# Patient Record
Sex: MALE | Race: BLACK OR AFRICAN AMERICAN | NOT HISPANIC OR LATINO | Employment: OTHER | URBAN - METROPOLITAN AREA
[De-identification: names, ages, dates, MRNs, and addresses within clinical notes are randomized per-mention and may not be internally consistent; named-entity substitution may affect disease eponyms.]

---

## 2023-12-27 ENCOUNTER — APPOINTMENT (OUTPATIENT)
Dept: UROLOGY | Facility: HOSPITAL | Age: 88
End: 2023-12-27

## 2023-12-28 ENCOUNTER — TELEMEDICINE (OUTPATIENT)
Dept: UROLOGY | Facility: CLINIC | Age: 88
End: 2023-12-28
Payer: MEDICARE

## 2023-12-28 DIAGNOSIS — N13.8 BPH WITH OBSTRUCTION/LOWER URINARY TRACT SYMPTOMS: Primary | ICD-10-CM

## 2023-12-28 DIAGNOSIS — N40.1 BPH WITH OBSTRUCTION/LOWER URINARY TRACT SYMPTOMS: Primary | ICD-10-CM

## 2023-12-28 PROBLEM — N42.9 PROSTATE DISORDER: Status: ACTIVE | Noted: 2023-12-28

## 2023-12-28 PROCEDURE — 99213 OFFICE O/P EST LOW 20 MIN: CPT | Performed by: NURSE PRACTITIONER

## 2023-12-28 RX ORDER — DOXAZOSIN 2 MG/1
2 TABLET ORAL DAILY
COMMUNITY
Start: 2015-01-19 | End: 2023-12-28 | Stop reason: SDUPTHER

## 2023-12-28 RX ORDER — FINASTERIDE 5 MG/1
5 TABLET, FILM COATED ORAL DAILY
Qty: 90 TABLET | Refills: 3 | Status: SHIPPED | OUTPATIENT
Start: 2023-12-28 | End: 2024-02-08 | Stop reason: SDUPTHER

## 2023-12-28 RX ORDER — FINASTERIDE 5 MG/1
1 TABLET, FILM COATED ORAL DAILY
COMMUNITY
Start: 2015-01-19 | End: 2023-12-28 | Stop reason: SDUPTHER

## 2023-12-28 RX ORDER — DOXAZOSIN 2 MG/1
2 TABLET ORAL DAILY
Qty: 90 TABLET | Refills: 3 | Status: SHIPPED | OUTPATIENT
Start: 2023-12-28

## 2023-12-28 NOTE — PROGRESS NOTES
Subjective   Patient ID: Chemo Maldonado is a 89 y.o. male presenting virtually for FUV     HPI  Last visit with Dr. Sullivan 2022. History of BPH with LUTS on finasteride 5 mg QD and doxazosin 2 mg every day  PSA screening - 2023 0.52, 22 0.5, 21 0.6, 10/28/20 0.43  Family history of prostate cancer - Brother, , with bone mets  Today's visit: 89 year old male presents via phone call for routine follow up. He has been doing well and continues with NTF x2 which is not bothersome. He denies any side effects on finasteride and doxazosin. NKDA. Denies gross hematuria, dysuria, nocturia, flank pain, and bothersome frequency or urgency.     Review of Systems  All other systems have been reviewed and are negative for complaint.    Objective   Physical Exam    Assessment/Plan   Diagnoses and all orders for this visit:  BPH with obstruction/lower urinary tract symptoms      PSA screening - 2023 0.52, 22 0.5, 21 0.6, 10/28/20 0.43  Family history of prostate cancer - Brother, , with bone mets  Today's visit: 89 year old male presents via phone call for routine follow up. He has been doing well and continues with NTF x2 which is not bothersome. He denies any side effects on finasteride and doxazosin. NKDA. Denies gross hematuria, dysuria, nocturia, flank pain, and bothersome frequency or urgency.     Continue Finasteride 5 mg daily and Doxazosin 2 mg daily. Refilled today     He is doing well. I suggested that he continue his current routine. I asked him to follow up with me in 1 year for re-evaluation or sooner as needed. All questions and concerns were addressed. Patient verbalizes understanding and has no other questions at this time.     Stacie Villagomez, CNP

## 2024-01-03 ENCOUNTER — APPOINTMENT (OUTPATIENT)
Dept: UROLOGY | Facility: HOSPITAL | Age: 89
End: 2024-01-03

## 2024-02-08 DIAGNOSIS — N13.8 BPH WITH OBSTRUCTION/LOWER URINARY TRACT SYMPTOMS: ICD-10-CM

## 2024-02-08 DIAGNOSIS — N40.1 BPH WITH OBSTRUCTION/LOWER URINARY TRACT SYMPTOMS: ICD-10-CM

## 2024-02-08 RX ORDER — FINASTERIDE 5 MG/1
5 TABLET, FILM COATED ORAL DAILY
Qty: 90 TABLET | Refills: 3 | Status: SHIPPED | OUTPATIENT
Start: 2024-02-08

## 2024-04-01 ENCOUNTER — APPOINTMENT (OUTPATIENT)
Dept: RADIOLOGY | Facility: HOSPITAL | Age: 89
DRG: 558 | End: 2024-04-01
Payer: MEDICARE

## 2024-04-01 ENCOUNTER — HOSPITAL ENCOUNTER (INPATIENT)
Facility: HOSPITAL | Age: 89
LOS: 5 days | Discharge: NURSING, REHAB OR LONG-TERM CARE | DRG: 558 | End: 2024-04-06
Attending: STUDENT IN AN ORGANIZED HEALTH CARE EDUCATION/TRAINING PROGRAM | Admitting: INTERNAL MEDICINE
Payer: MEDICARE

## 2024-04-01 ENCOUNTER — CLINICAL SUPPORT (OUTPATIENT)
Dept: EMERGENCY MEDICINE | Facility: HOSPITAL | Age: 89
DRG: 558 | End: 2024-04-01
Payer: MEDICARE

## 2024-04-01 DIAGNOSIS — F80.2: ICD-10-CM

## 2024-04-01 DIAGNOSIS — N40.1 BENIGN PROSTATIC HYPERPLASIA WITH LOWER URINARY TRACT SYMPTOMS, SYMPTOM DETAILS UNSPECIFIED: ICD-10-CM

## 2024-04-01 DIAGNOSIS — I48.91 ATRIAL FIBRILLATION, UNSPECIFIED TYPE (MULTI): Primary | ICD-10-CM

## 2024-04-01 DIAGNOSIS — N30.00 ACUTE CYSTITIS WITHOUT HEMATURIA: ICD-10-CM

## 2024-04-01 DIAGNOSIS — T79.6XXA TRAUMATIC RHABDOMYOLYSIS, INITIAL ENCOUNTER (CMS-HCC): ICD-10-CM

## 2024-04-01 DIAGNOSIS — R60.0 LOWER EXTREMITY EDEMA: ICD-10-CM

## 2024-04-01 DIAGNOSIS — E51.2 WERNICKE ENCEPHALOPATHY: ICD-10-CM

## 2024-04-01 LAB
ABO GROUP (TYPE) IN BLOOD: NORMAL
ALBUMIN SERPL BCP-MCNC: 3.5 G/DL (ref 3.4–5)
ALP SERPL-CCNC: 59 U/L (ref 33–136)
ALT SERPL W P-5'-P-CCNC: 45 U/L (ref 10–52)
ANION GAP BLDV CALCULATED.4IONS-SCNC: 13 MMOL/L (ref 10–25)
ANION GAP SERPL CALC-SCNC: 16 MMOL/L (ref 10–20)
ANTIBODY SCREEN: NORMAL
APPEARANCE UR: ABNORMAL
AST SERPL W P-5'-P-CCNC: 133 U/L (ref 9–39)
BASE EXCESS BLDV CALC-SCNC: 1.7 MMOL/L (ref -2–3)
BASOPHILS # BLD MANUAL: 0 X10*3/UL (ref 0–0.1)
BASOPHILS NFR BLD MANUAL: 0 %
BILIRUB SERPL-MCNC: 1 MG/DL (ref 0–1.2)
BILIRUB UR STRIP.AUTO-MCNC: NEGATIVE MG/DL
BODY TEMPERATURE: 37 DEGREES CELSIUS
BUN SERPL-MCNC: 28 MG/DL (ref 6–23)
CA-I BLDV-SCNC: 1.23 MMOL/L (ref 1.1–1.33)
CALCIUM SERPL-MCNC: 9.3 MG/DL (ref 8.6–10.6)
CHLORIDE BLDV-SCNC: 99 MMOL/L (ref 98–107)
CHLORIDE SERPL-SCNC: 103 MMOL/L (ref 98–107)
CK SERPL-CCNC: 8318 U/L (ref 0–325)
CO2 SERPL-SCNC: 22 MMOL/L (ref 21–32)
COLOR UR: ABNORMAL
CREAT SERPL-MCNC: 1.2 MG/DL (ref 0.5–1.3)
EGFRCR SERPLBLD CKD-EPI 2021: 58 ML/MIN/1.73M*2
EOSINOPHIL # BLD MANUAL: 0 X10*3/UL (ref 0–0.4)
EOSINOPHIL NFR BLD MANUAL: 0 %
ERYTHROCYTE [DISTWIDTH] IN BLOOD BY AUTOMATED COUNT: 13.2 % (ref 11.5–14.5)
ETHANOL SERPL-MCNC: <10 MG/DL
GLUCOSE BLDV-MCNC: 184 MG/DL (ref 74–99)
GLUCOSE SERPL-MCNC: 167 MG/DL (ref 74–99)
GLUCOSE UR STRIP.AUTO-MCNC: NORMAL MG/DL
HCO3 BLDV-SCNC: 26.6 MMOL/L (ref 22–26)
HCT VFR BLD AUTO: 40 % (ref 41–52)
HCT VFR BLD EST: 44 % (ref 41–52)
HGB BLD-MCNC: 14 G/DL (ref 13.5–17.5)
HGB BLDV-MCNC: 14.7 G/DL (ref 13.5–17.5)
IMM GRANULOCYTES # BLD AUTO: 0.12 X10*3/UL (ref 0–0.5)
IMM GRANULOCYTES NFR BLD AUTO: 1.1 % (ref 0–0.9)
INR PPP: 1.3 (ref 0.9–1.1)
KETONES UR STRIP.AUTO-MCNC: NEGATIVE MG/DL
LACTATE BLDV-SCNC: 3.3 MMOL/L (ref 0.4–2)
LACTATE SERPL-SCNC: 1.6 MMOL/L (ref 0.4–2)
LACTATE SERPL-SCNC: 3.3 MMOL/L (ref 0.4–2)
LACTATE SERPL-SCNC: 4.3 MMOL/L (ref 0.4–2)
LEUKOCYTE ESTERASE UR QL STRIP.AUTO: ABNORMAL
LYMPHOCYTES # BLD MANUAL: 0.48 X10*3/UL (ref 0.8–3)
LYMPHOCYTES NFR BLD MANUAL: 4.3 %
MCH RBC QN AUTO: 30 PG (ref 26–34)
MCHC RBC AUTO-ENTMCNC: 35 G/DL (ref 32–36)
MCV RBC AUTO: 86 FL (ref 80–100)
MONOCYTES # BLD MANUAL: 0.87 X10*3/UL (ref 0.05–0.8)
MONOCYTES NFR BLD MANUAL: 7.8 %
MUCOUS THREADS #/AREA URNS AUTO: ABNORMAL /LPF
NEUTROPHILS # BLD MANUAL: 9.76 X10*3/UL (ref 1.6–5.5)
NEUTS BAND # BLD MANUAL: 0.38 X10*3/UL (ref 0–0.5)
NEUTS BAND NFR BLD MANUAL: 3.4 %
NEUTS SEG # BLD MANUAL: 9.38 X10*3/UL (ref 1.6–5)
NEUTS SEG NFR BLD MANUAL: 84.5 %
NITRITE UR QL STRIP.AUTO: NEGATIVE
NRBC BLD-RTO: 0 /100 WBCS (ref 0–0)
OVALOCYTES BLD QL SMEAR: ABNORMAL
OXYHGB MFR BLDV: 54.6 % (ref 45–75)
PCO2 BLDV: 42 MM HG (ref 41–51)
PH BLDV: 7.41 PH (ref 7.33–7.43)
PH UR STRIP.AUTO: 6 [PH]
PLATELET # BLD AUTO: 85 X10*3/UL (ref 150–450)
PO2 BLDV: 33 MM HG (ref 35–45)
POTASSIUM BLDV-SCNC: 4.3 MMOL/L (ref 3.5–5.3)
POTASSIUM SERPL-SCNC: 4.3 MMOL/L (ref 3.5–5.3)
PROT SERPL-MCNC: 6.5 G/DL (ref 6.4–8.2)
PROT UR STRIP.AUTO-MCNC: ABNORMAL MG/DL
PROTHROMBIN TIME: 14.3 SECONDS (ref 9.8–12.8)
RBC # BLD AUTO: 4.67 X10*6/UL (ref 4.5–5.9)
RBC # UR STRIP.AUTO: ABNORMAL /UL
RBC #/AREA URNS AUTO: >20 /HPF
RBC MORPH BLD: ABNORMAL
RH FACTOR (ANTIGEN D): NORMAL
SAO2 % BLDV: 56 % (ref 45–75)
SODIUM BLDV-SCNC: 134 MMOL/L (ref 136–145)
SODIUM SERPL-SCNC: 137 MMOL/L (ref 136–145)
SP GR UR STRIP.AUTO: >1.05
TOTAL CELLS COUNTED BLD: 116
UROBILINOGEN UR STRIP.AUTO-MCNC: NORMAL MG/DL
WBC # BLD AUTO: 11.1 X10*3/UL (ref 4.4–11.3)
WBC #/AREA URNS AUTO: >50 /HPF
WBC CLUMPS #/AREA URNS AUTO: ABNORMAL /HPF

## 2024-04-01 PROCEDURE — 82077 ASSAY SPEC XCP UR&BREATH IA: CPT | Performed by: STUDENT IN AN ORGANIZED HEALTH CARE EDUCATION/TRAINING PROGRAM

## 2024-04-01 PROCEDURE — 93970 EXTREMITY STUDY: CPT

## 2024-04-01 PROCEDURE — 87086 URINE CULTURE/COLONY COUNT: CPT | Performed by: SURGERY

## 2024-04-01 PROCEDURE — 81001 URINALYSIS AUTO W/SCOPE: CPT | Performed by: SURGERY

## 2024-04-01 PROCEDURE — 2550000001 HC RX 255 CONTRASTS: Performed by: STUDENT IN AN ORGANIZED HEALTH CARE EDUCATION/TRAINING PROGRAM

## 2024-04-01 PROCEDURE — 71260 CT THORAX DX C+: CPT

## 2024-04-01 PROCEDURE — 99285 EMERGENCY DEPT VISIT HI MDM: CPT | Performed by: STUDENT IN AN ORGANIZED HEALTH CARE EDUCATION/TRAINING PROGRAM

## 2024-04-01 PROCEDURE — 93005 ELECTROCARDIOGRAM TRACING: CPT

## 2024-04-01 PROCEDURE — 85007 BL SMEAR W/DIFF WBC COUNT: CPT | Performed by: STUDENT IN AN ORGANIZED HEALTH CARE EDUCATION/TRAINING PROGRAM

## 2024-04-01 PROCEDURE — 72128 CT CHEST SPINE W/O DYE: CPT | Mod: RCN

## 2024-04-01 PROCEDURE — 99285 EMERGENCY DEPT VISIT HI MDM: CPT | Mod: 25

## 2024-04-01 PROCEDURE — 72131 CT LUMBAR SPINE W/O DYE: CPT | Mod: RCN

## 2024-04-01 PROCEDURE — 85610 PROTHROMBIN TIME: CPT | Performed by: STUDENT IN AN ORGANIZED HEALTH CARE EDUCATION/TRAINING PROGRAM

## 2024-04-01 PROCEDURE — 93971 EXTREMITY STUDY: CPT | Performed by: RADIOLOGY

## 2024-04-01 PROCEDURE — 2500000004 HC RX 250 GENERAL PHARMACY W/ HCPCS (ALT 636 FOR OP/ED): Performed by: STUDENT IN AN ORGANIZED HEALTH CARE EDUCATION/TRAINING PROGRAM

## 2024-04-01 PROCEDURE — 83605 ASSAY OF LACTIC ACID: CPT | Performed by: STUDENT IN AN ORGANIZED HEALTH CARE EDUCATION/TRAINING PROGRAM

## 2024-04-01 PROCEDURE — 76376 3D RENDER W/INTRP POSTPROCES: CPT

## 2024-04-01 PROCEDURE — 70486 CT MAXILLOFACIAL W/O DYE: CPT

## 2024-04-01 PROCEDURE — 93010 ELECTROCARDIOGRAM REPORT: CPT | Performed by: STUDENT IN AN ORGANIZED HEALTH CARE EDUCATION/TRAINING PROGRAM

## 2024-04-01 PROCEDURE — 72125 CT NECK SPINE W/O DYE: CPT

## 2024-04-01 PROCEDURE — 85027 COMPLETE CBC AUTOMATED: CPT | Performed by: STUDENT IN AN ORGANIZED HEALTH CARE EDUCATION/TRAINING PROGRAM

## 2024-04-01 PROCEDURE — 84132 ASSAY OF SERUM POTASSIUM: CPT | Performed by: STUDENT IN AN ORGANIZED HEALTH CARE EDUCATION/TRAINING PROGRAM

## 2024-04-01 PROCEDURE — 99221 1ST HOSP IP/OBS SF/LOW 40: CPT | Performed by: SURGERY

## 2024-04-01 PROCEDURE — 72170 X-RAY EXAM OF PELVIS: CPT

## 2024-04-01 PROCEDURE — 82550 ASSAY OF CK (CPK): CPT | Performed by: STUDENT IN AN ORGANIZED HEALTH CARE EDUCATION/TRAINING PROGRAM

## 2024-04-01 PROCEDURE — G0390 TRAUMA RESPONS W/HOSP CRITI: HCPCS

## 2024-04-01 PROCEDURE — 83605 ASSAY OF LACTIC ACID: CPT | Mod: MUE | Performed by: STUDENT IN AN ORGANIZED HEALTH CARE EDUCATION/TRAINING PROGRAM

## 2024-04-01 PROCEDURE — 36415 COLL VENOUS BLD VENIPUNCTURE: CPT | Performed by: STUDENT IN AN ORGANIZED HEALTH CARE EDUCATION/TRAINING PROGRAM

## 2024-04-01 PROCEDURE — 80053 COMPREHEN METABOLIC PANEL: CPT | Performed by: STUDENT IN AN ORGANIZED HEALTH CARE EDUCATION/TRAINING PROGRAM

## 2024-04-01 PROCEDURE — 86901 BLOOD TYPING SEROLOGIC RH(D): CPT | Performed by: STUDENT IN AN ORGANIZED HEALTH CARE EDUCATION/TRAINING PROGRAM

## 2024-04-01 PROCEDURE — 1210000001 HC SEMI-PRIVATE ROOM DAILY

## 2024-04-01 PROCEDURE — 99223 1ST HOSP IP/OBS HIGH 75: CPT

## 2024-04-01 PROCEDURE — 2500000002 HC RX 250 W HCPCS SELF ADMINISTERED DRUGS (ALT 637 FOR MEDICARE OP, ALT 636 FOR OP/ED): Performed by: STUDENT IN AN ORGANIZED HEALTH CARE EDUCATION/TRAINING PROGRAM

## 2024-04-01 PROCEDURE — 71045 X-RAY EXAM CHEST 1 VIEW: CPT

## 2024-04-01 PROCEDURE — 70450 CT HEAD/BRAIN W/O DYE: CPT

## 2024-04-01 RX ORDER — SODIUM CHLORIDE 9 MG/ML
200 INJECTION, SOLUTION INTRAVENOUS CONTINUOUS
Status: DISCONTINUED | OUTPATIENT
Start: 2024-04-01 | End: 2024-04-03

## 2024-04-01 RX ORDER — ACETAMINOPHEN 325 MG/1
975 TABLET ORAL EVERY 6 HOURS PRN
Status: DISCONTINUED | OUTPATIENT
Start: 2024-04-01 | End: 2024-04-06 | Stop reason: HOSPADM

## 2024-04-01 RX ORDER — FINASTERIDE 5 MG/1
5 TABLET, FILM COATED ORAL DAILY
Status: DISCONTINUED | OUTPATIENT
Start: 2024-04-01 | End: 2024-04-06 | Stop reason: HOSPADM

## 2024-04-01 RX ORDER — FLUTICASONE PROPIONATE 50 MCG
2 SPRAY, SUSPENSION (ML) NASAL DAILY
Status: DISCONTINUED | OUTPATIENT
Start: 2024-04-01 | End: 2024-04-06 | Stop reason: HOSPADM

## 2024-04-01 RX ORDER — DOXAZOSIN 2 MG/1
2 TABLET ORAL DAILY
Status: DISCONTINUED | OUTPATIENT
Start: 2024-04-01 | End: 2024-04-02

## 2024-04-01 RX ORDER — ENOXAPARIN SODIUM 100 MG/ML
40 INJECTION SUBCUTANEOUS EVERY 24 HOURS
Status: DISCONTINUED | OUTPATIENT
Start: 2024-04-01 | End: 2024-04-06 | Stop reason: HOSPADM

## 2024-04-01 RX ADMIN — IOHEXOL 100 ML: 350 INJECTION, SOLUTION INTRAVENOUS at 11:40

## 2024-04-01 RX ADMIN — ENOXAPARIN SODIUM 40 MG: 100 INJECTION SUBCUTANEOUS at 20:30

## 2024-04-01 RX ADMIN — FLUTICASONE PROPIONATE 2 SPRAY: 50 SPRAY, METERED NASAL at 19:38

## 2024-04-01 RX ADMIN — ACETAMINOPHEN 975 MG: 325 TABLET ORAL at 19:55

## 2024-04-01 RX ADMIN — SODIUM CHLORIDE 200 ML/HR: 9 INJECTION, SOLUTION INTRAVENOUS at 17:25

## 2024-04-01 RX ADMIN — SODIUM CHLORIDE, SODIUM LACTATE, POTASSIUM CHLORIDE, AND CALCIUM CHLORIDE 1000 ML: 600; 310; 30; 20 INJECTION, SOLUTION INTRAVENOUS at 11:22

## 2024-04-01 ASSESSMENT — ENCOUNTER SYMPTOMS
ENDOCRINE NEGATIVE: 1
BACK PAIN: 0
ACTIVITY CHANGE: 0
CONSTITUTIONAL NEGATIVE: 1
HEADACHES: 0
ABDOMINAL PAIN: 0
PALPITATIONS: 0
NEUROLOGICAL NEGATIVE: 1
SHORTNESS OF BREATH: 0
NUMBNESS: 0
WEAKNESS: 0
MYALGIAS: 0
SORE THROAT: 0
TROUBLE SWALLOWING: 0
VOMITING: 0
FACIAL SWELLING: 0
RECTAL PAIN: 0
COLOR CHANGE: 0
NAUSEA: 0
DIZZINESS: 0
LIGHT-HEADEDNESS: 0
HEMATURIA: 0
GASTROINTESTINAL NEGATIVE: 1
CONFUSION: 0
EYE PAIN: 0
WHEEZING: 0
CHEST TIGHTNESS: 0
NECK PAIN: 0
FATIGUE: 0
AGITATION: 0
COUGH: 0
FEVER: 0
MYALGIAS: 1

## 2024-04-01 ASSESSMENT — LIFESTYLE VARIABLES
TOTAL SCORE: 0
HAVE YOU EVER FELT YOU SHOULD CUT DOWN ON YOUR DRINKING: NO
HAVE PEOPLE ANNOYED YOU BY CRITICIZING YOUR DRINKING: NO
EVER FELT BAD OR GUILTY ABOUT YOUR DRINKING: NO
EVER HAD A DRINK FIRST THING IN THE MORNING TO STEADY YOUR NERVES TO GET RID OF A HANGOVER: NO

## 2024-04-01 ASSESSMENT — PAIN - FUNCTIONAL ASSESSMENT: PAIN_FUNCTIONAL_ASSESSMENT: 0-10

## 2024-04-01 NOTE — H&P
History Of Present Illness  Chemo Maldonado is a 89M with hx of HTN, HLD, and BPH presenting to [facility] by EMS following a fall going down his stairs. Patient initially came into the ED as a limited trauma because he was reported to have been on blood thinners, but patient denies ever taking blood thinners and there is no record of him being prescribed any. He recalls the incident and does not report any LOC, dizziness, or light headedness. Patient also denies any head trauma or striking his head. He reports he fell around 10pm last night and had been lying there since. He called his god son who came and found him at the bottom of the stairs on his left side. Patient reports recently got a new cane, which was also found at the bottom of the stairs. Denies any pain, weakness, or headache. Patient denies hematuria, chest pain, or shortness of breath. Patient does endorse feeling of fluttering in his chest that comes and goes at night time. He also denies any appetite changes leading up to the fall. Patient reports that he lives alone in a duplex, is able to carry out ADL's, he still drives and is independent.     ED Course: given 1L LR bolus  Labs:   RFP- Na 137, K 4.3, Cl 103, Bicarb 22, BUN 28, Cr 1.20 (baseline 1.30 in )   CBC- WBC 11.1, Hgb 14.0, Hct 40.0, Plt 85 (baseline )   Creatine kinase 8,318  Lactate 3.3 -> 4.3   VBG: pH 7.41, pCO2 42, pO2 33   Alk phos 59, , ALT 45, total bilirubin 1.0    Imaging:   CT head, CT cervical spine: no acute process   CT A/P, thoracic and lumbar spine: 2.9 cm enhancing lesion noted along the posterior wall the bladder   CXR: no acute process    Past Medical History  BPH  HTN  Lymphedema     Surgical History  History reviewed. No pertinent surgical history.     Social History  Patient quit smoking in   Reports drinking 1 bottle liquor every year  Denies any other drug use   Other pertinent social history in HPI.    Family History  Brother  of prostate cancer with  bone mets.    Home meds:   Finasteride 5mg   Doxazosin 2mg   Furosemide 40mg BID  Lisinopril 10mg  Simvastatin 20mg   Flonase 50mcg     Allergies  Patient has no allergy information on record.    Review of Systems   Constitutional: Negative.    HENT: Negative.     Respiratory:  Negative for chest tightness, shortness of breath and wheezing.    Cardiovascular:  Positive for leg swelling. Negative for chest pain and palpitations.   Gastrointestinal: Negative.    Endocrine: Negative.    Genitourinary: Negative.    Musculoskeletal:  Positive for myalgias.   Skin: Negative.    Neurological: Negative.         Physical Exam  Constitutional:       Appearance: Normal appearance.   HENT:      Head: Normocephalic and atraumatic.   Cardiovascular:      Rate and Rhythm: Normal rate. Rhythm irregular.      Heart sounds: Normal heart sounds.   Pulmonary:      Effort: Pulmonary effort is normal.      Breath sounds: Normal breath sounds.   Abdominal:      General: Abdomen is flat. Bowel sounds are normal.      Palpations: Abdomen is soft.   Musculoskeletal:         General: Swelling present.      Cervical back: Normal range of motion. No tenderness.      Comments: 2+ BLE that patient reports has been present for 20+ years  Bruising on left shoulder    Skin:     General: Skin is warm and dry.   Neurological:      General: No focal deficit present.      Mental Status: He is alert and oriented to person, place, and time. Mental status is at baseline.   Psychiatric:         Mood and Affect: Mood normal.         Behavior: Behavior normal.         Thought Content: Thought content normal.          Last Recorded Vitals  Blood pressure 136/87, pulse (!) 101, temperature 36.2 °C (97.2 °F), resp. rate (!) 22, SpO2 98 %.    Relevant Results        Results for orders placed or performed during the hospital encounter of 04/01/24 (from the past 24 hour(s))   CBC and Auto Differential   Result Value Ref Range    WBC 11.1 4.4 - 11.3 x10*3/uL    nRBC  0.0 0.0 - 0.0 /100 WBCs    RBC 4.67 4.50 - 5.90 x10*6/uL    Hemoglobin 14.0 13.5 - 17.5 g/dL    Hematocrit 40.0 (L) 41.0 - 52.0 %    MCV 86 80 - 100 fL    MCH 30.0 26.0 - 34.0 pg    MCHC 35.0 32.0 - 36.0 g/dL    RDW 13.2 11.5 - 14.5 %    Platelets 85 (L) 150 - 450 x10*3/uL    Immature Granulocytes %, Automated 1.1 (H) 0.0 - 0.9 %    Immature Granulocytes Absolute, Automated 0.12 0.00 - 0.50 x10*3/uL   Comprehensive Metabolic Panel   Result Value Ref Range    Glucose 167 (H) 74 - 99 mg/dL    Sodium 137 136 - 145 mmol/L    Potassium 4.3 3.5 - 5.3 mmol/L    Chloride 103 98 - 107 mmol/L    Bicarbonate 22 21 - 32 mmol/L    Anion Gap 16 10 - 20 mmol/L    Urea Nitrogen 28 (H) 6 - 23 mg/dL    Creatinine 1.20 0.50 - 1.30 mg/dL    eGFR 58 (L) >60 mL/min/1.73m*2    Calcium 9.3 8.6 - 10.6 mg/dL    Albumin 3.5 3.4 - 5.0 g/dL    Alkaline Phosphatase 59 33 - 136 U/L    Total Protein 6.5 6.4 - 8.2 g/dL     (H) 9 - 39 U/L    Bilirubin, Total 1.0 0.0 - 1.2 mg/dL    ALT 45 10 - 52 U/L   Alcohol   Result Value Ref Range    Alcohol <10 <=10 mg/dL   Lactate   Result Value Ref Range    Lactate 3.3 (H) 0.4 - 2.0 mmol/L   Blood Gas, Venous Full Panel   Result Value Ref Range    POCT pH, Venous 7.41 7.33 - 7.43 pH    POCT pCO2, Venous 42 41 - 51 mm Hg    POCT pO2, Venous 33 (L) 35 - 45 mm Hg    POCT SO2, Venous 56 45 - 75 %    POCT Oxy Hemoglobin, Venous 54.6 45.0 - 75.0 %    POCT Hematocrit Calculated, Venous 44.0 41.0 - 52.0 %    POCT Sodium, Venous 134 (L) 136 - 145 mmol/L    POCT Potassium, Venous 4.3 3.5 - 5.3 mmol/L    POCT Chloride, Venous 99 98 - 107 mmol/L    POCT Ionized Calicum, Venous 1.23 1.10 - 1.33 mmol/L    POCT Glucose, Venous 184 (H) 74 - 99 mg/dL    POCT Lactate, Venous 3.3 (H) 0.4 - 2.0 mmol/L    POCT Base Excess, Venous 1.7 -2.0 - 3.0 mmol/L    POCT HCO3 Calculated, Venous 26.6 (H) 22.0 - 26.0 mmol/L    POCT Hemoglobin, Venous 14.7 13.5 - 17.5 g/dL    POCT Anion Gap, Venous 13.0 10.0 - 25.0 mmol/L    Patient  Temperature 37.0 degrees Celsius   Creatine Kinase   Result Value Ref Range    Creatine Kinase 8,318 (H) 0 - 325 U/L   Manual Differential   Result Value Ref Range    Neutrophils %, Manual 84.5 40.0 - 80.0 %    Bands %, Manual 3.4 0.0 - 5.0 %    Lymphocytes %, Manual 4.3 13.0 - 44.0 %    Monocytes %, Manual 7.8 2.0 - 10.0 %    Eosinophils %, Manual 0.0 0.0 - 6.0 %    Basophils %, Manual 0.0 0.0 - 2.0 %    Seg Neutrophils Absolute, Manual 9.38 (H) 1.60 - 5.00 x10*3/uL    Bands Absolute, Manual 0.38 0.00 - 0.50 x10*3/uL    Lymphocytes Absolute, Manual 0.48 (L) 0.80 - 3.00 x10*3/uL    Monocytes Absolute, Manual 0.87 (H) 0.05 - 0.80 x10*3/uL    Eosinophils Absolute, Manual 0.00 0.00 - 0.40 x10*3/uL    Basophils Absolute, Manual 0.00 0.00 - 0.10 x10*3/uL    Total Cells Counted 116     Neutrophils Absolute, Manual 9.76 (H) 1.60 - 5.50 x10*3/uL    RBC Morphology See Below     Ovalocytes Few    Protime-INR   Result Value Ref Range    Protime 14.3 (H) 9.8 - 12.8 seconds    INR 1.3 (H) 0.9 - 1.1   Type And Screen   Result Value Ref Range    ABO TYPE O     Rh TYPE POS     ANTIBODY SCREEN NEG    Lactate   Result Value Ref Range    Lactate 4.3 (HH) 0.4 - 2.0 mmol/L     CT thoracic spine wo IV contrast    Result Date: 4/1/2024  STUDY: CT Chest, Abdomen, and Pelvis with IV Contrast, CT Thoracic Spine and Lumbar Spine without IV Contrast; 04/01/2024, 11:46 AM. INDICATION: Trauma, unspecified. COMPARISON: Not available. ACCESSION NUMBER(S): [id number], [id number], [id number] ORDERING CLINICIAN: ARGENIS VU TECHNIQUE: CT of the chest, abdomen, and pelvis was performed.  Contiguous axial images were obtained at 3 mm slice thickness through the chest, abdomen, and pelvis.  Coronal and sagittal reconstructions at 3 mm slice thickness were performed.  Omnipaque 350 100 mL was administered intravenously. Please note that spinal images were generated from the original CT abdomen and pelvis imaging. FINDINGS: CHEST: MEDIASTINUM:  The heart is normal in size without pericardial effusion.  Central vascular structures opacify normally.  LUNGS/PLEURA: There is no pleural effusion, pleural thickening, or pneumothorax. The airways are patent. Lungs demonstrate mild left basal atelectasis.  There are cystic changes noted within the right lung apex which could represent some element of possible fibrosis.. LYMPH NODES: Thoracic lymph nodes are not enlarged. ABDOMEN:  LIVER: No hepatomegaly.  Smooth surface contour.  Normal attenuation.  BILE DUCTS: No intrahepatic or extrahepatic biliary ductal dilatation.  GALLBLADDER: The gallbladder is visualized.  There is cholelithiasis. STOMACH: No abnormalities identified.  PANCREAS: No masses or ductal dilatation.  SPLEEN: No splenomegaly or focal splenic lesion.  ADRENAL GLANDS: No thickening or nodules.  KIDNEYS AND URETERS: Kidneys are normal in size and location.  No renal or ureteral calculi.  There is a 3.1 cm hypoattenuating lesion within the right kidney most consistent with a simple cyst.  PELVIS:  BLADDER: There is a 1.9 x 2.9 cm enhancing lesion along the posterior wall of the bladder (301-152, 303-80).  A right posterior lateral bladder diverticulum is visualized.  REPRODUCTIVE ORGANS: There are calcific changes of the prostate gland noted.  BOWEL: There is no evidence of bowel thickening or dilatation to suggest mechanical obstruction.  VESSELS: No abnormalities identified.  Abdominal aorta is normal in caliber. There are calcific atherosclerotic changes of the abdominal aorta noted.  No abdominal aortic aneurysm.  PERITONEUM/RETROPERITONEUM/LYMPH NODES: No free fluid.  No pneumoperitoneum. No lymphadenopathy.  ABDOMINAL WALL: No abnormalities identified. SOFT TISSUES: There are probable bilateral hydroceles noted.  BONES: No acute fracture or aggressive osseous lesion. THORACIC SPINE: The alignment is anatomic.  There is no fracture or traumatic subluxation. The vertebral body heights are well  maintained.  Degenerative disc disease of the mid to lower thoracic spine with anterior marginal spurring noted.  nNo significant central canal stenosis is demonstrated.  The neural foramina are patent throughout.  The paravertebral soft tissues are within normal limits. LUMBAR SPINE: The alignment is anatomic.  There is no fracture or traumatic subluxation. The vertebral body heights are well maintained.  There is multilevel degenerative disc disease of the lumbar spine most prominently noted L4-L5..  No significant central canal stenosis is demonstrated.  The neural foramina are patent throughout.  The paravertebral soft tissues are within normal limits.    1.  No definite CT evidence of acute thoracic vascular injury. 2.  No definite CT evidence of acute abdominal or pelvic visceral/vascular injury. 3.  No pulmonary consolidation, pleural effusions or pneumothorax. 4.  No abdominal/pelvic fluid collections or pneumoperitoneum. 5.  There is a 2.9 cm enhancing lesion noted along the posterior wall the bladder which could be neoplastic in etiology.  I recommend further urologic evaluation. 6.  Probable bilateral hydroceles. 7.  No definite acute thoracic or lumbar vertebral body compression/fracture.  There is some multilevel degenerative disc disease of the thoracic and lumbar spine.. Signed by Ayan De La O MD    CT chest abdomen pelvis w IV contrast    Result Date: 4/1/2024  STUDY: CT Chest, Abdomen, and Pelvis with IV Contrast, CT Thoracic Spine and Lumbar Spine without IV Contrast; 04/01/2024, 11:46 AM. INDICATION: Trauma, unspecified. COMPARISON: Not available. ACCESSION NUMBER(S): [id number], [id number], [id number] ORDERING CLINICIAN: ARGENIS VU TECHNIQUE: CT of the chest, abdomen, and pelvis was performed.  Contiguous axial images were obtained at 3 mm slice thickness through the chest, abdomen, and pelvis.  Coronal and sagittal reconstructions at 3 mm slice thickness were performed.  Omnipaque  350 100 mL was administered intravenously. Please note that spinal images were generated from the original CT abdomen and pelvis imaging. FINDINGS: CHEST: MEDIASTINUM: The heart is normal in size without pericardial effusion.  Central vascular structures opacify normally.  LUNGS/PLEURA: There is no pleural effusion, pleural thickening, or pneumothorax. The airways are patent. Lungs demonstrate mild left basal atelectasis.  There are cystic changes noted within the right lung apex which could represent some element of possible fibrosis.. LYMPH NODES: Thoracic lymph nodes are not enlarged. ABDOMEN:  LIVER: No hepatomegaly.  Smooth surface contour.  Normal attenuation.  BILE DUCTS: No intrahepatic or extrahepatic biliary ductal dilatation.  GALLBLADDER: The gallbladder is visualized.  There is cholelithiasis. STOMACH: No abnormalities identified.  PANCREAS: No masses or ductal dilatation.  SPLEEN: No splenomegaly or focal splenic lesion.  ADRENAL GLANDS: No thickening or nodules.  KIDNEYS AND URETERS: Kidneys are normal in size and location.  No renal or ureteral calculi.  There is a 3.1 cm hypoattenuating lesion within the right kidney most consistent with a simple cyst.  PELVIS:  BLADDER: There is a 1.9 x 2.9 cm enhancing lesion along the posterior wall of the bladder (301-152, 303-80).  A right posterior lateral bladder diverticulum is visualized.  REPRODUCTIVE ORGANS: There are calcific changes of the prostate gland noted.  BOWEL: There is no evidence of bowel thickening or dilatation to suggest mechanical obstruction.  VESSELS: No abnormalities identified.  Abdominal aorta is normal in caliber. There are calcific atherosclerotic changes of the abdominal aorta noted.  No abdominal aortic aneurysm.  PERITONEUM/RETROPERITONEUM/LYMPH NODES: No free fluid.  No pneumoperitoneum. No lymphadenopathy.  ABDOMINAL WALL: No abnormalities identified. SOFT TISSUES: There are probable bilateral hydroceles noted.  BONES: No acute  fracture or aggressive osseous lesion. THORACIC SPINE: The alignment is anatomic.  There is no fracture or traumatic subluxation. The vertebral body heights are well maintained.  Degenerative disc disease of the mid to lower thoracic spine with anterior marginal spurring noted.  nNo significant central canal stenosis is demonstrated.  The neural foramina are patent throughout.  The paravertebral soft tissues are within normal limits. LUMBAR SPINE: The alignment is anatomic.  There is no fracture or traumatic subluxation. The vertebral body heights are well maintained.  There is multilevel degenerative disc disease of the lumbar spine most prominently noted L4-L5..  No significant central canal stenosis is demonstrated.  The neural foramina are patent throughout.  The paravertebral soft tissues are within normal limits.    1.  No definite CT evidence of acute thoracic vascular injury. 2.  No definite CT evidence of acute abdominal or pelvic visceral/vascular injury. 3.  No pulmonary consolidation, pleural effusions or pneumothorax. 4.  No abdominal/pelvic fluid collections or pneumoperitoneum. 5.  There is a 2.9 cm enhancing lesion noted along the posterior wall the bladder which could be neoplastic in etiology.  I recommend further urologic evaluation. 6.  Probable bilateral hydroceles. 7.  No definite acute thoracic or lumbar vertebral body compression/fracture.  There is some multilevel degenerative disc disease of the thoracic and lumbar spine.. Signed by Ayan De La O MD    CT lumbar spine wo IV contrast    Result Date: 4/1/2024  STUDY: CT Chest, Abdomen, and Pelvis with IV Contrast, CT Thoracic Spine and Lumbar Spine without IV Contrast; 04/01/2024, 11:46 AM. INDICATION: Trauma, unspecified. COMPARISON: Not available. ACCESSION NUMBER(S): [id number], [id number], [id number] ORDERING CLINICIAN: ARGENIS VU TECHNIQUE: CT of the chest, abdomen, and pelvis was performed.  Contiguous axial images were  obtained at 3 mm slice thickness through the chest, abdomen, and pelvis.  Coronal and sagittal reconstructions at 3 mm slice thickness were performed.  Omnipaque 350 100 mL was administered intravenously. Please note that spinal images were generated from the original CT abdomen and pelvis imaging. FINDINGS: CHEST: MEDIASTINUM: The heart is normal in size without pericardial effusion.  Central vascular structures opacify normally.  LUNGS/PLEURA: There is no pleural effusion, pleural thickening, or pneumothorax. The airways are patent. Lungs demonstrate mild left basal atelectasis.  There are cystic changes noted within the right lung apex which could represent some element of possible fibrosis.. LYMPH NODES: Thoracic lymph nodes are not enlarged. ABDOMEN:  LIVER: No hepatomegaly.  Smooth surface contour.  Normal attenuation.  BILE DUCTS: No intrahepatic or extrahepatic biliary ductal dilatation.  GALLBLADDER: The gallbladder is visualized.  There is cholelithiasis. STOMACH: No abnormalities identified.  PANCREAS: No masses or ductal dilatation.  SPLEEN: No splenomegaly or focal splenic lesion.  ADRENAL GLANDS: No thickening or nodules.  KIDNEYS AND URETERS: Kidneys are normal in size and location.  No renal or ureteral calculi.  There is a 3.1 cm hypoattenuating lesion within the right kidney most consistent with a simple cyst.  PELVIS:  BLADDER: There is a 1.9 x 2.9 cm enhancing lesion along the posterior wall of the bladder (301-152, 303-80).  A right posterior lateral bladder diverticulum is visualized.  REPRODUCTIVE ORGANS: There are calcific changes of the prostate gland noted.  BOWEL: There is no evidence of bowel thickening or dilatation to suggest mechanical obstruction.  VESSELS: No abnormalities identified.  Abdominal aorta is normal in caliber. There are calcific atherosclerotic changes of the abdominal aorta noted.  No abdominal aortic aneurysm.  PERITONEUM/RETROPERITONEUM/LYMPH NODES: No free fluid.  No  pneumoperitoneum. No lymphadenopathy.  ABDOMINAL WALL: No abnormalities identified. SOFT TISSUES: There are probable bilateral hydroceles noted.  BONES: No acute fracture or aggressive osseous lesion. THORACIC SPINE: The alignment is anatomic.  There is no fracture or traumatic subluxation. The vertebral body heights are well maintained.  Degenerative disc disease of the mid to lower thoracic spine with anterior marginal spurring noted.  nNo significant central canal stenosis is demonstrated.  The neural foramina are patent throughout.  The paravertebral soft tissues are within normal limits. LUMBAR SPINE: The alignment is anatomic.  There is no fracture or traumatic subluxation. The vertebral body heights are well maintained.  There is multilevel degenerative disc disease of the lumbar spine most prominently noted L4-L5..  No significant central canal stenosis is demonstrated.  The neural foramina are patent throughout.  The paravertebral soft tissues are within normal limits.    1.  No definite CT evidence of acute thoracic vascular injury. 2.  No definite CT evidence of acute abdominal or pelvic visceral/vascular injury. 3.  No pulmonary consolidation, pleural effusions or pneumothorax. 4.  No abdominal/pelvic fluid collections or pneumoperitoneum. 5.  There is a 2.9 cm enhancing lesion noted along the posterior wall the bladder which could be neoplastic in etiology.  I recommend further urologic evaluation. 6.  Probable bilateral hydroceles. 7.  No definite acute thoracic or lumbar vertebral body compression/fracture.  There is some multilevel degenerative disc disease of the thoracic and lumbar spine.. Signed by Ayan De La O MD    CT head W O contrast trauma protocol    Result Date: 4/1/2024  Interpreted By:  Td Guillaume and Sullivan Shannon STUDY: CT HEAD W/O CONTRAST TRAUMA PROTOCOL; CT FACIAL BONES WO IV CONTRAST; CT CERVICAL SPINE WO IV CONTRAST;  4/1/2024 11:41 am   INDICATION: Signs/Symptoms:trauma.    Chemo Maldonado MRN [id number]  1934   COMPARISON: Correlation with CT temporal bone 2012.   ACCESSION NUMBER(S): [id number]; [id number]; [id number]   ORDERING CLINICIAN: ARGENIS VU   TECHNIQUE: Noncontrast axial CT scan of head was performed. Angled reformats in brain and bone windows were generated. The images were reviewed in bone, brain, blood and soft tissue windows.   Thin cut axial CT images through the facial bones were obtained and reconstructed in the coronal and sagittal plane. 3D reconstructions were performed on an independent workstation and provided for review.   Axial CT images of the cervical spine are obtained. Axial, coronal and sagittal reconstructions are provided for review.   FINDINGS:   Noncontrast CT head:   No acute infarct or intracranial hemorrhage. No intraventricular hemorrhage. Patchy and confluent subcortical and periventricular white matter hypodensities are noted, which are nonspecific but likely reflect sequelae of chronic small ischemic changes. No mass effect or midline shift. No extra-axial fluid collection. The ventricles, sulci and basal cisterns are diffusely prominent, likely reflecting age-related parenchymal volume loss..   No displaced calvarial fracture.     CT facial bones:   Orbits: The bony orbits are intact. Bilateral lens replacements are noted. The orbital contents are otherwise unremarkable.   Facial Bones: There is no displaced facial bone fracture.   Mandible/Temporomandibular Joints: The patient is completely edentulous. Visualized portions of mandible and bilateral temporomandibular joints are intact.   Paranasal Sinuses/Mastoids: Minimal scattered mucosal thickening in the paranasal sinuses. The bilateral mastoid air cells are clear.   Soft tissues: Unremarkable.     CT cervical spine:   There is transitional vertebra in the cervicothoracic junction with truncated bilateral C7 cervical ribs. For the purposes of this examination, the  transitional vertebra will be referred to as C7 with the assumption of 7 cervical vertebrae.   Alignment: The atlantooccipital and atlantoaxial intervals are preserved. The cervical spinal columns are preserved, including the spinolaminar and posterior spinous lines, without evidence of spondylolisthesis.   Vertebrae/Intervertebral Discs: The vertebral bodies and intervertebral discs demonstrate expected height. Mild multilevel degenerative changes of the cervical spine are noted, including osteophytosis and posterior disc osteophyte complexes.   No fracture of the cervical spine.   Sesamoid ossicles are noted in the nuchal ligament overlying C3-C4. The prevertebral and posterior paraspinous soft tissues are otherwise unremarkable.           1. No acute intracranial abnormality. No calvarial fracture. 2. No acute facial bone fracture. 3. No acute fracture subluxation of the cervical spine detected     I personally reviewed the images/study and I agree with the findings as stated by Radiology resident Dr. Garcia.   MACRO: None   Signed by: Td Guillaume 2024 12:41 PM Dictation workstation:   [id number]    CT cervical spine wo IV contrast    Result Date: 2024  Interpreted By:  Td Guillaume and Sullivan Shannon STUDY: CT HEAD W/O CONTRAST TRAUMA PROTOCOL; CT FACIAL BONES WO IV CONTRAST; CT CERVICAL SPINE WO IV CONTRAST;  2024 11:41 am   INDICATION: Signs/Symptoms:trauma.   Chemo Maldonado MRN [id number]  1934   COMPARISON: Correlation with CT temporal bone 2012.   ACCESSION NUMBER(S): [id number]; [id number]; [id number]   ORDERING CLINICIAN: ARGENIS VU   TECHNIQUE: Noncontrast axial CT scan of head was performed. Angled reformats in brain and bone windows were generated. The images were reviewed in bone, brain, blood and soft tissue windows.   Thin cut axial CT images through the facial bones were obtained and reconstructed in the coronal and sagittal plane. 3D reconstructions were  performed on an independent workstation and provided for review.   Axial CT images of the cervical spine are obtained. Axial, coronal and sagittal reconstructions are provided for review.   FINDINGS:   Noncontrast CT head:   No acute infarct or intracranial hemorrhage. No intraventricular hemorrhage. Patchy and confluent subcortical and periventricular white matter hypodensities are noted, which are nonspecific but likely reflect sequelae of chronic small ischemic changes. No mass effect or midline shift. No extra-axial fluid collection. The ventricles, sulci and basal cisterns are diffusely prominent, likely reflecting age-related parenchymal volume loss..   No displaced calvarial fracture.     CT facial bones:   Orbits: The bony orbits are intact. Bilateral lens replacements are noted. The orbital contents are otherwise unremarkable.   Facial Bones: There is no displaced facial bone fracture.   Mandible/Temporomandibular Joints: The patient is completely edentulous. Visualized portions of mandible and bilateral temporomandibular joints are intact.   Paranasal Sinuses/Mastoids: Minimal scattered mucosal thickening in the paranasal sinuses. The bilateral mastoid air cells are clear.   Soft tissues: Unremarkable.     CT cervical spine:   There is transitional vertebra in the cervicothoracic junction with truncated bilateral C7 cervical ribs. For the purposes of this examination, the transitional vertebra will be referred to as C7 with the assumption of 7 cervical vertebrae.   Alignment: The atlantooccipital and atlantoaxial intervals are preserved. The cervical spinal columns are preserved, including the spinolaminar and posterior spinous lines, without evidence of spondylolisthesis.   Vertebrae/Intervertebral Discs: The vertebral bodies and intervertebral discs demonstrate expected height. Mild multilevel degenerative changes of the cervical spine are noted, including osteophytosis and posterior disc osteophyte  complexes.   No fracture of the cervical spine.   Sesamoid ossicles are noted in the nuchal ligament overlying C3-C4. The prevertebral and posterior paraspinous soft tissues are otherwise unremarkable.           1. No acute intracranial abnormality. No calvarial fracture. 2. No acute facial bone fracture. 3. No acute fracture subluxation of the cervical spine detected     I personally reviewed the images/study and I agree with the findings as stated by Radiology resident Dr. Garcia.   MACRO: None   Signed by: Td Guillaume 2024 12:41 PM Dictation workstation:   [id number]    CT maxillofacial bones wo IV contrast    Result Date: 2024  Interpreted By:  Td Guillaume and Sullivan Shannon STUDY: CT HEAD W/O CONTRAST TRAUMA PROTOCOL; CT FACIAL BONES WO IV CONTRAST; CT CERVICAL SPINE WO IV CONTRAST;  2024 11:41 am   INDICATION: Signs/Symptoms:trauma.   Chemo Maldondao MRN [id number]  1934   COMPARISON: Correlation with CT temporal bone 2012.   ACCESSION NUMBER(S): [id number]; [id number]; [id number]   ORDERING CLINICIAN: ARGENIS VU   TECHNIQUE: Noncontrast axial CT scan of head was performed. Angled reformats in brain and bone windows were generated. The images were reviewed in bone, brain, blood and soft tissue windows.   Thin cut axial CT images through the facial bones were obtained and reconstructed in the coronal and sagittal plane. 3D reconstructions were performed on an independent workstation and provided for review.   Axial CT images of the cervical spine are obtained. Axial, coronal and sagittal reconstructions are provided for review.   FINDINGS:   Noncontrast CT head:   No acute infarct or intracranial hemorrhage. No intraventricular hemorrhage. Patchy and confluent subcortical and periventricular white matter hypodensities are noted, which are nonspecific but likely reflect sequelae of chronic small ischemic changes. No mass effect or midline shift. No extra-axial fluid  collection. The ventricles, sulci and basal cisterns are diffusely prominent, likely reflecting age-related parenchymal volume loss..   No displaced calvarial fracture.     CT facial bones:   Orbits: The bony orbits are intact. Bilateral lens replacements are noted. The orbital contents are otherwise unremarkable.   Facial Bones: There is no displaced facial bone fracture.   Mandible/Temporomandibular Joints: The patient is completely edentulous. Visualized portions of mandible and bilateral temporomandibular joints are intact.   Paranasal Sinuses/Mastoids: Minimal scattered mucosal thickening in the paranasal sinuses. The bilateral mastoid air cells are clear.   Soft tissues: Unremarkable.     CT cervical spine:   There is transitional vertebra in the cervicothoracic junction with truncated bilateral C7 cervical ribs. For the purposes of this examination, the transitional vertebra will be referred to as C7 with the assumption of 7 cervical vertebrae.   Alignment: The atlantooccipital and atlantoaxial intervals are preserved. The cervical spinal columns are preserved, including the spinolaminar and posterior spinous lines, without evidence of spondylolisthesis.   Vertebrae/Intervertebral Discs: The vertebral bodies and intervertebral discs demonstrate expected height. Mild multilevel degenerative changes of the cervical spine are noted, including osteophytosis and posterior disc osteophyte complexes.   No fracture of the cervical spine.   Sesamoid ossicles are noted in the nuchal ligament overlying C3-C4. The prevertebral and posterior paraspinous soft tissues are otherwise unremarkable.           1. No acute intracranial abnormality. No calvarial fracture. 2. No acute facial bone fracture. 3. No acute fracture subluxation of the cervical spine detected     I personally reviewed the images/study and I agree with the findings as stated by Radiology resident Dr. Garcia.   MACRO: None   Signed by: Td Guillaume 4/1/2024  12:41 PM Dictation workstation:   [id number]    XR chest 1 view    Result Date: 4/1/2024  STUDY: Chest Radiograph;  4/1/2024 11:52 AM INDICATION: Trauma. COMPARISON: None Available ACCESSION NUMBER(S): [id number] ORDERING CLINICIAN: ARGENIS VU TECHNIQUE:  Frontal chest was obtained at 11:51 hours. FINDINGS: CARDIOMEDIASTINAL SILHOUETTE: Cardiomediastinal silhouette is normal in size and configuration.  LUNGS: Lungs are clear.  Elevation of the left hemidiaphragm.  ABDOMEN: No remarkable upper abdominal findings.  BONES: No acute osseous changes.    No acute process. Signed by Alberto Hubbard MD    XR pelvis 1-2 views    Result Date: 4/1/2024  STUDY: Pelvis Radiographs; 4/1/2024 11:52 AM INDICATION: Trauma. COMPARISON: None Available. ACCESSION NUMBER(S): [id number] ORDERING CLINICIAN: ARGENIS UV TECHNIQUE:  One view(s) of the pelvis. FINDINGS:  The pelvic ring is intact.  There is no acute fracture.  Mild degenerative change of the hips.    No acute osseous abnormality. Signed by Alberto Hubbard MD        Assessment/Plan   Principal Problem:    Atrial fibrillation, unspecified type (CMS/HCC)      Chemo Maldonado is an 89 y.o. male with hx of HTN, HLD, and BPH presenting to [facility] by EMS following a fall going down his stairs. Patient had been laying on the floor since 10pm last night until he was brought in by EMS. Labs concerning for elevated CK, elevated lactate, elevated AST and chronic thrombocytopenia. Patients presentation is consistent with rhabdomyolysis in the setting of his mechanical fall and prolonged immobilization. Patient also with new a-fib that is rate controlled.      # Rhabdomyolysis   :: Elevated CK level 8313  :: Elevated Lactate 3.3--> 4.3  :: elevated    :: myalgias, patient down for several hours  :: No evidence of AARON or hemolysis   Plan:   - Start 200cc/hr sodium chloride infusion   - Will repeat CK and lactate in the am and trend  - RFP BID and replete electrolytes as  needed   - will hold simvastatin in setting of elevated transaminases     # new onset a-fib, rate controlled   :: patient reports feeling fluttering in chest at night that is paroxysmal in nature   :: Rate controlled   :: [id number] Score of 3  Plan:   - Repeat EKG   - follow up TSH   - Consider Echo to rule out valvular disease or cardiac causes of new onset a-fib   - Replete electrolytes as needed     #Fall  :: likely mechanical in nature, given no LOC, no Hx of cardiac issues   Plan:  - consult PT/OT    #HTN  - Will hold home lisinopril and furosemide in the setting of treatment for rhabdo, current blood pressures with systolics in 120-130s    #Lymphedema   :: Patient stated had this for 20 years   :: slightly more edematous than baseline   Plan:   - BLE venous duplex to r/o DVT  - Compression stockings     # BPH  # Bladder lesion   :: There is a 1.9 x 2.9 cm enhancing lesion along the posterior wall of  the bladder   :: No reported hematuria  Plan:   - follow up urinalysis   - Follows with urology outpatient for BPH, will schedule follow up for lesion   - continue home finasteride and doxazosin     #Thrombocytopenia   :: Platelets 85 on admission, with baseline between   :: Thought to be secondary to benign ethnic leukopenia, ITP, or early MDS   :: Has been seen in outpatient hematology clinic for stable chronic thrombocytopenia with plan to repeat CBC in 1 year with consideration for BMBx if continues to decrease  Plan:  - Will monitor and ensure outpatient hematology follow-up     F: NS 200cc/hr  E: replete as needed  N: regular diet    DVT ppx: lovenox  GI ppx: not indicated    Access: PIV     Code status: DNR/DNI   NOK: Herber (nephew) [phone]    Patient to be discussed and staffed with attending in the morning.    Mayuri Nascimento MD

## 2024-04-01 NOTE — H&P
[facility]  TRAUMA SERVICE - HISTORY AND PHYSICAL / CONSULT    [facility]  MRN: [id number]  Admit Date: 401  : 1936  AGE: 88 y.o.   GENDER: male  ==============================================================================  MECHANISM OF INJURY / CHIEF COMPLAINT:   Patient sustained a fall going up his stairs. He remembers the incident and does not recall any LOC, dizziness, or light headedness. Per EMS reports, his cane was found on the landing and patiewnt was found at the bottom of the stairs. He reports he fell around 10pm last night and had been lying there since. Denies any current pain, weakness, or headache.  Patient initially came into the ED as a limited trauma because he was reported to have been on blood thinners, but patient denies ever taking blood thinners and there is no record of him being prescribed thinners.    LOC (yes/no?): no  Anticoagulant / Anti-platelet Rx? (for what dx?): none  Referring Facility Name (N/A for scene EMR run): N/A    INJURIES:   none    OTHER MEDICAL PROBLEMS:  HTN  BPH    INCIDENTAL FINDINGS:  2.9 cm enhancing lesion noted along the posterior wall the bladder  ==============================================================================  ADMISSION PLAN OF CARE:  No traumatic injuries  Admit to medicine given CK greater than 8000  Rest of dispo per ED/medicine teams  Consultants notified (specialty, provider name, time): none    Trauma surgery to sign off at this time. Please message or call with any questions or concerns.    Discussed with attending, Dr. Harris, who agrees with plan.    Isabel Jackson MD  General Surgery PGY1  Trauma Surgery Service  ==============================================================================  PAST MEDICAL HISTORY:   PMH: HTN, BPH  History reviewed. No pertinent past medical history.    Last menstrual period: N/A    PSH: Denies    FH: Noncontributory to trauma  anitra  Gastric cancer- brother    SOCIAL HISTORY:  Smoking Status: former smoker   Tobacco Use: history of abuse   Alcohol Use: occasionally   Drug Use: denies   Additional History: smoked cigarettes 17 pack years, quit in 1980. Drinks 2 drinks of NIKO and 4 beers a week for the past many years     MEDICATIONS:     lisinopril 10 mg oral tablet : Last Dose Taken:  , 1 tab(s) orally once a day         simvastatin 20 mg oral tablet: Last Dose Taken:  , 1 tab(s) orally once  a day (at bedtime)         finasteride 5 mg oral tablet: Last Dose Taken:  , 1 tab(s) orally once  a day         Ferrex 150 Forte Vitamin B12 with Folic Acid and Iron oral capsule: Last  Dose Taken:  ,  orally once a day         doxazosin 2 mg oral tablet: Last Dose Taken:  , 1 tab(s) orally once a  day         furosemide 40 mg oral tablet: Last Dose Taken:  ,  orally 2 times a day         potassium chloride 10 mEq oral tablet, extended release: Last Dose Taken:   , 1 tab(s) orally 2 times a day         fluticasone nasal 27.5 mcg/inh nasal spray: Last Dose Taken:  , 1 spray(s)  nasal once a day    ALLERGIES: NKDA    REVIEW OF SYSTEMS:  Review of Systems   Constitutional:  Negative for activity change, fatigue and fever.   HENT:  Negative for ear pain, facial swelling, sore throat and trouble swallowing.    Eyes:  Negative for pain and visual disturbance.   Respiratory:  Negative for cough and shortness of breath.    Cardiovascular:  Positive for leg swelling. Negative for chest pain.   Gastrointestinal:  Negative for abdominal pain, nausea, rectal pain and vomiting.   Genitourinary:  Positive for scrotal swelling. Negative for hematuria, penile discharge and penile pain.   Musculoskeletal:  Negative for back pain, myalgias and neck pain.   Skin:  Negative for color change.   Neurological:  Negative for dizziness, weakness, light-headedness, numbness and headaches.   Psychiatric/Behavioral:  Negative for agitation and confusion.      PHYSICAL  EXAM:  PRIMARY SURVEY:  Airway  Airway is patent.     Breathing  Breathing is normal. Right breath sounds are normal. Left breath sounds are normal.     Circulation  Cardiac rhythm is regular. Rate is regular.   Pulses  Radial: 2+ on the right; 2+ on the left.  Femoral: 2+ on the right; 2+ on the left.    Disability  Jude Coma Score  Eye:4   Verbal:5   Motor:6      15       Motor Strength   strength:  4/5 on the right  4/5 on the left  Dorsiflex strength:  4/5 on the right  4/5 on the left  Plantarflex strength:  4/5 on the right  4/5 on the left  The patient does not have a sensory deficit.       SECONDARY SURVEY/PHYSICAL EXAM:  Physical Exam  Constitutional:       General: He is not in acute distress.     Appearance: He is not toxic-appearing.   HENT:      Head: Normocephalic and atraumatic.      Right Ear: Ear canal normal.      Left Ear: Ear canal normal.      Nose:      Comments: No blood in the posterior oropharynx. No blood in the nares.     Mouth/Throat:      Pharynx: Oropharynx is clear.   Eyes:      Extraocular Movements: Extraocular movements intact.      Conjunctiva/sclera: Conjunctivae normal.   Neck:      Comments: C collar in place  Cardiovascular:      Rate and Rhythm: Normal rate and regular rhythm.   Pulmonary:      Effort: Pulmonary effort is normal. No respiratory distress.      Breath sounds: Normal breath sounds.   Abdominal:      General: Abdomen is flat. There is no distension.      Palpations: Abdomen is soft.      Tenderness: There is no abdominal tenderness. There is no guarding or rebound.   Genitourinary:     Penis: Normal.       Rectum: Normal.      Comments: Extreme scrotal swelling. No blood at the urethral meatus   Musculoskeletal:         General: Signs of injury (small skin tear over the medial aspect of right arm) present. No swelling, tenderness or deformity. Normal range of motion.      Cervical back: No tenderness.   Skin:     General: Skin is warm and dry.       Coloration: Skin is not pale.   Neurological:      General: No focal deficit present.      Mental Status: He is alert and oriented to person, place, and time.      Sensory: No sensory deficit.      Motor: No weakness.   Psychiatric:         Mood and Affect: Mood normal.         Behavior: Behavior normal.     IMAGING SUMMARY:  (summary of findings, not a copy of dictation)  CT Head/Face: no acute findings  CT C-Spine: no acute findings  CT Chest/Abd/Pelvis: no acute findings. Incidental bladder mass, possible neoplasm  CXR/PXR: no acute findings  Other(s): n/a    LABS:  Results from last 7 days   Lab Units 04/01/24  1109   WBC AUTO x10*3/uL 11.1   HEMOGLOBIN g/dL 14.0   HEMATOCRIT % 40.0*   PLATELETS AUTO x10*3/uL 85*   LYMPHO PCT MAN % 4.3   MONO PCT MAN % 7.8   EOSINO PCT MAN % 0.0     Results from last 7 days   Lab Units 04/01/24  1120   INR  1.3*     Results from last 7 days   Lab Units 04/01/24  1109   SODIUM mmol/L 137   POTASSIUM mmol/L 4.3   CHLORIDE mmol/L 103   CO2 mmol/L 22   BUN mg/dL 28*   CREATININE mg/dL 1.20   CALCIUM mg/dL 9.3   PROTEIN TOTAL g/dL 6.5   BILIRUBIN TOTAL mg/dL 1.0   ALK PHOS U/L 59   ALT U/L 45   AST U/L 133*   GLUCOSE mg/dL 167*     Results from last 7 days   Lab Units 04/01/24  1109   BILIRUBIN TOTAL mg/dL 1.0        CK- 8318    I have reviewed all laboratory and imaging results ordered/pertinent for this encounter.

## 2024-04-01 NOTE — SIGNIFICANT EVENT
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     Patient lives alone in a duplex, which he shares with a friend. Yesterday evening (around 9-10PM), patient tripped and fell down 4 steps. Patient denied any presyncopal symptoms (no lightheadedness, dizziness, chest pain, palpitations). Denied any loss of consciousness or head trauma. Patient's neighbor knocked on his door this morning and called his godson for assistance. Patient was found lying down on his left side. Patient denied any pain after the fall. Patient had another fall a couple days ago.     Patient has been in his baseline state of health. Denied any decreased appetite or decreased PO intake. Denied any hematuria or dark colored urine. Patient reported occasional palpitations (feels like his heart is racing).     ED Presentation:   - VS: T 36.2 C, /80, HR 99, RR 18, spO2 100% on room air  - Labs:   WBC 11.1, Hgb 14.0, Hct 40.0, Plt 85 (baseline )  Na 137, K 4.3, Cl 103, Bicarb 22, BUN 28, Cr 1.20 (baseline 1.30 in 2020)   Alk phos 59, , ALT 45, total bilirubin 1.0  Creatine kinase 8,318  Lactate 3.3 -> 4.3   VBG: pH 7.41, pCO2 42, pO2 33  Alcohol <10  - Imaging:   CT head non-contrast, CT cervical spine:   1. No acute intracranial abnormality. No calvarial fracture.  2. No acute facial bone fracture.  3. No acute fracture subluxation of the cervical spine detected    CT abdomen/pelvis, thoracic/lumbar spine:   1.  No definite CT evidence of acute thoracic vascular injury.  2.  No definite CT evidence of acute abdominal or pelvic visceral/vascular injury.  3.  No pulmonary consolidation, pleural effusions or pneumothorax.  4.  No abdominal/pelvic fluid collections or pneumoperitoneum.  5.  There  is a 2.9 cm enhancing lesion noted along the posterior wall the bladder which could be neoplastic in etiology.  I recommend further urologic evaluation.  6.  Probable bilateral hydroceles.  7.  No definite acute thoracic or lumbar vertebral body compression/fracture.  There is some multilevel degenerative disc disease of the thoracic and lumbar spine.    CXR: No acute process    Pelvis XR: No acute osseous abnormality   - ED interventions: Received 1L LR bolus. Admitted to medicine for further management.     PMH: As above    PSH: Denied    FH: Prostate cancer (brother, nephew)     Social History:   - Living situation: Lives alone in a duplex, which he shares with a friend. Patient's wife passed away in September 2023. Patient's godson lives ~10 minutes away.   - Functional status: Independent with ADLs and IADLs. Drives independently. Shops for groceries independently. Uses a cane for ambulation.   - Alcohol use: One bottle of liquor lasts for about a year  - Tobacco use: Quit smoking cigarettes in 1980  - Illicit drug use: Denied    Allergies: NKDA    Home Medications: (confirmed with patient on admission)   - Doxazosin 2mg   - Finasteride 5mg   - Flonase 50mcg  - Furosemide 40mg   - Lisinopril 10mg   - Simvastatin 20mg     Physical Exam:   Constitutional: Well-appearing, no acute distress, awake, alert, conversant, resting comfortably in bed  Skin: Warm and well-perfused, bruise on left posterior shoulder, superficial cuts on right forearm   HEENT: Normocephalic, atraumatic, EOMI, anicteric sclera  Cardiovascular: Irregular rhythm, not tachycardic  Pulmonary: Breathing comfortably on room air, CTAB  Abdominal: Soft, nontender, nondistended, normoactive bowel sounds  Extremities: B/l 3+ edema, no asymmetry, no tenderness to palpation, skin changes consistent with chronic lymphedema  Psych: Pleasant mood and affect      Assessment/Plan:   Chemo Maldonado is an 88 y/o with PMH of BPH with LUTS, chronic  leukopenia/thrombocytopenia (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 Fall is mechanical in nature. Afebrile and HDS on admission. Labs concerning for chronic stable thrombocytopenia, elevated AST, elevated CK and elevated lactate. CT head, cervical spine, lumbar spine unremarkable. CT A/P notable for 2.9cm posterior bladder wall lesion. Presentation is consistent with rhabdomyolysis in the setting of a prolonged immobilization after a mechanical fall. Will treat patient with NS at 200cc/hr. Patient also has new-onset atrial fibrillation (currently rate-controlled with HR in the 90-100s).     #Rhabdomyolysis  #Elevated transaminases   - Dark red-tinged urine in the ED  - Elevated CK to 8,318 and AST of 133 on admission   - No evidence of AARON or hemolysis  - S/p 1L LR bolus in the ED  - Started NS 200cc/hr (no history of heart failure)    #Mechanical fall  - Consulted PT/OT for homegoing recommendations    #New-onset atrial fibrillation   - Currently rate-controlled with HR in the 90-100s  - TSH pending      #Bladder wall lesion   - CT showed a 2.9 cm enhancing lesion noted along the posterior wall the bladder  - Patient denied any hematuria   - Pending UA  - Can likely arrange outpatient urology follow-up for cystoscopy     #Thrombocytopenia  - Platelet count of 85 on admission (baseline )  - Has been seen in outpatient hematology clinic for stable chronic leukopenia and thrombocytopenia  - Thought to be secondary to benign ethnic leukopenia, ITP, or early MDS   - Per last hematology note (11/2021): plan was for repeat CBC in 1 year with consideration for BMBx if counts drop   - Will monitor and ensure outpatient hematology follow-up    #Bilateral LE edema  - Patient reported chronic (>20 year history) of bilateral LE edema  - Denied LE pain   - Exam consistent with chronic lymphedema   - Ordered b/l LE duplex ultrasound  to r/o DVT  - Will consider an echocardiogram this admission   - Will obtain compression stockings    F: NS 200cc/hr  E: replete as needed  N: regular diet  DVT ppx: lovenox  GI ppx: not indicated  Access: PIV    Code status: DNR/DNI (confirmed on admission)  NOK: Herber (nephew) [phone]

## 2024-04-01 NOTE — ED PROVIDER NOTES
HPI   No chief complaint on file.      HPI  Patient is an 89-year-old male who presented as a limited trauma activation after a fall.  Patient sustained a fall going up his stairs. He remembers the incident and does not recall any LOC, dizziness, or light headedness. Per EMS reports, his cane was found on the landing and patiewnt was found at the bottom of the stairs. He reports he fell around 10pm last night and had been lying there since. Denies any current pain, weakness, or headache. Patient initially came into the ED as a limited trauma because he was reported to have been on blood thinners, but patient denies ever taking blood thinners and there is no record of him being prescribed thinners.      PMH:as above.  Meds:reviewed in EMR.  PSH:reviewed in EMR.  allergies:reviewed in EMR.  social:Denies X3.  Family History: non-contributory to acute presentation.    A full 10 point Review of Systems was reviewed with the patient and is negative unless stated in the HPI.                      Jude Coma Scale Score: 15                     Patient History   History reviewed. No pertinent past medical history.  History reviewed. No pertinent surgical history.  No family history on file.  Social History     Tobacco Use    Smoking status: Not on file    Smokeless tobacco: Not on file   Substance Use Topics    Alcohol use: Not on file    Drug use: Not on file       Physical Exam   ED Triage Vitals [04/01/24 1109]   Temperature Heart Rate Respirations BP   36.2 °C (97.2 °F) 99 18 122/80      Pulse Ox Temp src Heart Rate Source Patient Position   100 % -- -- --      BP Location FiO2 (%)     -- 21 %       Physical Exam  Physical Exam  PRIMARY SURVEY:  Airway  Airway is patent.      Breathing  Breathing is normal. Right breath sounds are normal. Left breath sounds are normal.      Circulation  Cardiac rhythm is regular. Rate is regular.   Pulses  Radial: 2+ on the right; 2+ on the left.  Femoral: 2+ on the right; 2+ on the  left.     Disability  [place] Coma Score  Eye:4   Verbal:5   Motor:6      15       Motor Strength   strength:  4/5 on the right  4/5 on the left  Dorsiflex strength:  4/5 on the right  4/5 on the left  Plantarflex strength:  4/5 on the right  4/5 on the left  The patient does not have a sensory deficit.      Constitutional:       General: He is not in acute distress.     Appearance: He is not toxic-appearing.   HENT:      Head: Normocephalic and atraumatic.      Right Ear: Ear canal normal.      Left Ear: Ear canal normal.      Nose:      Comments: No blood in the posterior oropharynx. No blood in the nares.     Mouth/Throat:      Pharynx: Oropharynx is clear.   Eyes:      Extraocular Movements: Extraocular movements intact.      Conjunctiva/sclera: Conjunctivae normal.   Neck:      Comments: C collar in place  Cardiovascular:      Rate and Rhythm: Normal rate and regular rhythm.   Pulmonary:      Effort: Pulmonary effort is normal. No respiratory distress.      Breath sounds: Normal breath sounds.   Abdominal:      General: Abdomen is flat. There is no distension.      Palpations: Abdomen is soft.      Tenderness: There is no abdominal tenderness. There is no guarding or rebound.   Genitourinary:     Penis: Normal.       Rectum: Normal.      Comments: Extreme scrotal swelling. No blood at the urethral meatus   Musculoskeletal:         General: Signs of injury (small skin tear over the medial aspect of right arm) present. No swelling, tenderness or deformity. Normal range of motion.      Cervical back: No tenderness.   Skin:     General: Skin is warm and dry.      Coloration: Skin is not pale.   Neurological:      General: No focal deficit present.      Mental Status: He is alert and oriented to person, place, and time.      Sensory: No sensory deficit.      Motor: No weakness.   Psychiatric:         Mood and Affect: Mood normal.         Behavior: Behavior normal.   ED Course & MDM   ED Course as of 04/03/24  1824   Mon Apr 01, 2024   1310 EKG independently interpreted by me, ED attending, obtained at 1307, A-fib with RVR rate of 104 unremarkable axis, intervals no ST segment elevation [ASUNCION]      ED Course User Index  [ASUNCION] Marina Keita MD         Diagnoses as of 04/03/24 1824   Atrial fibrillation, unspecified type (CMS/HCC)   Traumatic rhabdomyolysis, initial encounter (CMS/[facility])       Medical Decision Making  The patient is an 89-year-old male who presented as a limited trauma activation due to fall that was believed to be on blood thinners.  He was hemodynamically stable and afebrile on arrival.  Secondary survey revealed lower extremity swelling and significant testicular swelling.  Trauma labs and trauma pan scan were obtained as well as a CK due to time down.      Patient's pan scan was negative and trauma at this point signed off.  He was found to be in atrial fibrillation without rapid ventricular response (see above for formal interpretation).  He was also found to be in rhabdomyolysis with creatinine kinase above 8000.  For all these reasons, patient was admitted to medicine for further management.  He was in stable condition at the time of his admission.      The patient was discussed with Dr. Keita who agrees.      Sameer Verma MD  Emergency Medicine, PGY3    Procedure  Procedures     Cuauhtemoc Verma MD  Resident  04/03/24 1831

## 2024-04-02 LAB
ALBUMIN SERPL BCP-MCNC: 3 G/DL (ref 3.4–5)
ALP SERPL-CCNC: 49 U/L (ref 33–136)
ALT SERPL W P-5'-P-CCNC: 70 U/L (ref 10–52)
ANION GAP SERPL CALC-SCNC: 11 MMOL/L (ref 10–20)
AST SERPL W P-5'-P-CCNC: 166 U/L (ref 9–39)
BASOPHILS # BLD MANUAL: 0 X10*3/UL (ref 0–0.1)
BASOPHILS NFR BLD MANUAL: 0 %
BILIRUB SERPL-MCNC: 1.1 MG/DL (ref 0–1.2)
BUN SERPL-MCNC: 25 MG/DL (ref 6–23)
BURR CELLS BLD QL SMEAR: ABNORMAL
CALCIUM SERPL-MCNC: 9.3 MG/DL (ref 8.6–10.6)
CHLORIDE SERPL-SCNC: 105 MMOL/L (ref 98–107)
CK SERPL-CCNC: 5877 U/L (ref 0–325)
CO2 SERPL-SCNC: 28 MMOL/L (ref 21–32)
CREAT SERPL-MCNC: 1.1 MG/DL (ref 0.5–1.3)
EGFRCR SERPLBLD CKD-EPI 2021: 64 ML/MIN/1.73M*2
EOSINOPHIL # BLD MANUAL: 0 X10*3/UL (ref 0–0.4)
EOSINOPHIL NFR BLD MANUAL: 0 %
ERYTHROCYTE [DISTWIDTH] IN BLOOD BY AUTOMATED COUNT: 13.5 % (ref 11.5–14.5)
GLUCOSE SERPL-MCNC: 104 MG/DL (ref 74–99)
HCT VFR BLD AUTO: 42.7 % (ref 41–52)
HGB BLD-MCNC: 14 G/DL (ref 13.5–17.5)
IMM GRANULOCYTES # BLD AUTO: 0.04 X10*3/UL (ref 0–0.5)
IMM GRANULOCYTES NFR BLD AUTO: 0.4 % (ref 0–0.9)
LYMPHOCYTES # BLD MANUAL: 1.1 X10*3/UL (ref 0.8–3)
LYMPHOCYTES NFR BLD MANUAL: 10.3 %
MAGNESIUM SERPL-MCNC: 1.86 MG/DL (ref 1.6–2.4)
MCH RBC QN AUTO: 28.9 PG (ref 26–34)
MCHC RBC AUTO-ENTMCNC: 32.8 G/DL (ref 32–36)
MCV RBC AUTO: 88 FL (ref 80–100)
MONOCYTES # BLD MANUAL: 1.19 X10*3/UL (ref 0.05–0.8)
MONOCYTES NFR BLD MANUAL: 11.1 %
NEUTROPHILS # BLD MANUAL: 8.41 X10*3/UL (ref 1.6–5.5)
NEUTS BAND # BLD MANUAL: 0.18 X10*3/UL (ref 0–0.5)
NEUTS BAND NFR BLD MANUAL: 1.7 %
NEUTS SEG # BLD MANUAL: 8.23 X10*3/UL (ref 1.6–5)
NEUTS SEG NFR BLD MANUAL: 76.9 %
NRBC BLD-RTO: 0 /100 WBCS (ref 0–0)
OVALOCYTES BLD QL SMEAR: ABNORMAL
PLATELET # BLD AUTO: 90 X10*3/UL (ref 150–450)
POTASSIUM SERPL-SCNC: 4.1 MMOL/L (ref 3.5–5.3)
PROT SERPL-MCNC: 6 G/DL (ref 6.4–8.2)
RBC # BLD AUTO: 4.85 X10*6/UL (ref 4.5–5.9)
RBC MORPH BLD: ABNORMAL
SODIUM SERPL-SCNC: 140 MMOL/L (ref 136–145)
TARGETS BLD QL SMEAR: ABNORMAL
TOTAL CELLS COUNTED BLD: 117
TSH SERPL-ACNC: 1.91 MIU/L (ref 0.44–3.98)
WBC # BLD AUTO: 10.7 X10*3/UL (ref 4.4–11.3)

## 2024-04-02 PROCEDURE — 1210000001 HC SEMI-PRIVATE ROOM DAILY

## 2024-04-02 PROCEDURE — 2500000002 HC RX 250 W HCPCS SELF ADMINISTERED DRUGS (ALT 637 FOR MEDICARE OP, ALT 636 FOR OP/ED)

## 2024-04-02 PROCEDURE — 84075 ASSAY ALKALINE PHOSPHATASE: CPT | Performed by: STUDENT IN AN ORGANIZED HEALTH CARE EDUCATION/TRAINING PROGRAM

## 2024-04-02 PROCEDURE — 84443 ASSAY THYROID STIM HORMONE: CPT | Performed by: STUDENT IN AN ORGANIZED HEALTH CARE EDUCATION/TRAINING PROGRAM

## 2024-04-02 PROCEDURE — 2500000001 HC RX 250 WO HCPCS SELF ADMINISTERED DRUGS (ALT 637 FOR MEDICARE OP)

## 2024-04-02 PROCEDURE — 82550 ASSAY OF CK (CPK): CPT | Performed by: STUDENT IN AN ORGANIZED HEALTH CARE EDUCATION/TRAINING PROGRAM

## 2024-04-02 PROCEDURE — 85027 COMPLETE CBC AUTOMATED: CPT | Performed by: STUDENT IN AN ORGANIZED HEALTH CARE EDUCATION/TRAINING PROGRAM

## 2024-04-02 PROCEDURE — 2500000004 HC RX 250 GENERAL PHARMACY W/ HCPCS (ALT 636 FOR OP/ED)

## 2024-04-02 PROCEDURE — 85007 BL SMEAR W/DIFF WBC COUNT: CPT | Performed by: STUDENT IN AN ORGANIZED HEALTH CARE EDUCATION/TRAINING PROGRAM

## 2024-04-02 PROCEDURE — 2500000004 HC RX 250 GENERAL PHARMACY W/ HCPCS (ALT 636 FOR OP/ED): Performed by: STUDENT IN AN ORGANIZED HEALTH CARE EDUCATION/TRAINING PROGRAM

## 2024-04-02 PROCEDURE — 92610 EVALUATE SWALLOWING FUNCTION: CPT | Mod: GN

## 2024-04-02 PROCEDURE — 2500000002 HC RX 250 W HCPCS SELF ADMINISTERED DRUGS (ALT 637 FOR MEDICARE OP, ALT 636 FOR OP/ED): Performed by: STUDENT IN AN ORGANIZED HEALTH CARE EDUCATION/TRAINING PROGRAM

## 2024-04-02 PROCEDURE — 36415 COLL VENOUS BLD VENIPUNCTURE: CPT | Performed by: STUDENT IN AN ORGANIZED HEALTH CARE EDUCATION/TRAINING PROGRAM

## 2024-04-02 PROCEDURE — 83735 ASSAY OF MAGNESIUM: CPT | Performed by: STUDENT IN AN ORGANIZED HEALTH CARE EDUCATION/TRAINING PROGRAM

## 2024-04-02 PROCEDURE — 2500000001 HC RX 250 WO HCPCS SELF ADMINISTERED DRUGS (ALT 637 FOR MEDICARE OP): Performed by: STUDENT IN AN ORGANIZED HEALTH CARE EDUCATION/TRAINING PROGRAM

## 2024-04-02 RX ORDER — MAGNESIUM SULFATE HEPTAHYDRATE 40 MG/ML
2 INJECTION, SOLUTION INTRAVENOUS ONCE
Status: COMPLETED | OUTPATIENT
Start: 2024-04-02 | End: 2024-04-02

## 2024-04-02 RX ORDER — SIMVASTATIN 20 MG/1
20 TABLET, FILM COATED ORAL NIGHTLY
COMMUNITY

## 2024-04-02 RX ORDER — FINASTERIDE 5 MG/1
5 TABLET, FILM COATED ORAL DAILY
COMMUNITY

## 2024-04-02 RX ORDER — METOPROLOL TARTRATE 25 MG/1
12.5 TABLET, FILM COATED ORAL EVERY 12 HOURS
Status: DISCONTINUED | OUTPATIENT
Start: 2024-04-02 | End: 2024-04-06 | Stop reason: HOSPADM

## 2024-04-02 RX ORDER — HYDROXYZINE HYDROCHLORIDE 25 MG/1
25 TABLET, FILM COATED ORAL ONCE
Status: COMPLETED | OUTPATIENT
Start: 2024-04-02 | End: 2024-04-02

## 2024-04-02 RX ORDER — METOPROLOL TARTRATE 25 MG/1
12.5 TABLET, FILM COATED ORAL 2 TIMES DAILY
Status: DISCONTINUED | OUTPATIENT
Start: 2024-04-02 | End: 2024-04-02

## 2024-04-02 RX ORDER — FLUTICASONE PROPIONATE 50 MCG
1 SPRAY, SUSPENSION (ML) NASAL DAILY
COMMUNITY

## 2024-04-02 RX ORDER — POTASSIUM CHLORIDE 20 MEQ/1
20 TABLET, EXTENDED RELEASE ORAL 2 TIMES DAILY
COMMUNITY

## 2024-04-02 RX ORDER — CEFTRIAXONE 1 G/50ML
1 INJECTION, SOLUTION INTRAVENOUS EVERY 24 HOURS
Status: DISCONTINUED | OUTPATIENT
Start: 2024-04-02 | End: 2024-04-04

## 2024-04-02 RX ORDER — SIMVASTATIN 20 MG/1
20 TABLET, FILM COATED ORAL NIGHTLY
Status: DISCONTINUED | OUTPATIENT
Start: 2024-04-02 | End: 2024-04-06 | Stop reason: HOSPADM

## 2024-04-02 RX ORDER — FUROSEMIDE 40 MG/1
40 TABLET ORAL 2 TIMES DAILY
COMMUNITY

## 2024-04-02 RX ORDER — FOLIC ACID 1 MG/1
1 TABLET ORAL DAILY
Status: DISCONTINUED | OUTPATIENT
Start: 2024-04-02 | End: 2024-04-06 | Stop reason: HOSPADM

## 2024-04-02 RX ORDER — DOXAZOSIN 2 MG/1
2 TABLET ORAL NIGHTLY
COMMUNITY
End: 2024-04-06 | Stop reason: HOSPADM

## 2024-04-02 RX ADMIN — SODIUM CHLORIDE 200 ML/HR: 9 INJECTION, SOLUTION INTRAVENOUS at 08:55

## 2024-04-02 RX ADMIN — CEFTRIAXONE SODIUM 1 G: 1 INJECTION, SOLUTION INTRAVENOUS at 08:54

## 2024-04-02 RX ADMIN — FLUTICASONE PROPIONATE 2 SPRAY: 50 SPRAY, METERED NASAL at 08:22

## 2024-04-02 RX ADMIN — ENOXAPARIN SODIUM 40 MG: 100 INJECTION SUBCUTANEOUS at 21:15

## 2024-04-02 RX ADMIN — FINASTERIDE 5 MG: 5 TABLET, FILM COATED ORAL at 08:20

## 2024-04-02 RX ADMIN — MAGNESIUM SULFATE HEPTAHYDRATE 2 G: 40 INJECTION, SOLUTION INTRAVENOUS at 09:34

## 2024-04-02 RX ADMIN — SODIUM CHLORIDE 200 ML/HR: 9 INJECTION, SOLUTION INTRAVENOUS at 03:28

## 2024-04-02 RX ADMIN — THIAMINE HYDROCHLORIDE 500 MG: 100 INJECTION, SOLUTION INTRAMUSCULAR; INTRAVENOUS at 21:15

## 2024-04-02 RX ADMIN — HYDROXYZINE HYDROCHLORIDE 25 MG: 25 TABLET ORAL at 06:30

## 2024-04-02 ASSESSMENT — ACTIVITIES OF DAILY LIVING (ADL): LACK_OF_TRANSPORTATION: NO

## 2024-04-02 ASSESSMENT — PAIN SCALES - GENERAL
PAINLEVEL_OUTOF10: 0 - NO PAIN
PAINLEVEL_OUTOF10: 0 - NO PAIN

## 2024-04-02 ASSESSMENT — PAIN - FUNCTIONAL ASSESSMENT: PAIN_FUNCTIONAL_ASSESSMENT: 0-10

## 2024-04-02 NOTE — HOSPITAL COURSE
Chemo Maldonado is an 89 y.o. male with hx of HTN, HLD, and BPH [place] [facility] [place] going down his stairs. Was brought in by EMS as a trauma. Patient denied prodromal symptoms. No acute findings found on imaging of any fractures. On presentation to the ED, he was found to have elevated CK, elevated lactate, elevated AST and chronic thrombocytopenia. Patients presentation is consistent with rhabdomyolysis in the setting of his mechanical fall and prolonged immobilization. He was started on IV fluids and remained on them for 3 days with down trending CK and a lactate that cleared. UA was also positive for WBC and leukocyte esterase, which was treated with ceftriaxone pending Ucx. Ucx was positive for E.coli to which he was treated with 4 days of IV ceftriaxone and 4 days of 500mg q6 Cephalexin to complete the course.     Patient also presented with new onset atrial fibrillation which could be secondary to his overall fluid status vs structural heart issue. Repeat EKG showing patient in NSR. ECHO was obtained which showed 1. Left ventricular systolic function is normal with a 60-65% estimated ejection fraction.   2. Spectral Doppler shows an impaired relaxation pattern of left ventricular diastolic filling.   3. Mild aortic valve regurgitation.    Patient's doxazosin was held in regards to his recent falls and was started on 0.4mg Tamsulosin. Lasix 40mg daily was continued after improvement in rhabdomyolysis picture.     Patient will also have holter monitor sent to home since came in with a-fib but that has since resolved.     Patient was found to have small bladder lesion on CT, and should have further work up outpatient with his urologist that he follows with for his BPH.

## 2024-04-02 NOTE — PROGRESS NOTES
Pharmacy Medication History Review    Chemo Maldonado is a 89 y.o. male admitted for Atrial fibrillation, unspecified type (CMS/[facility]). Pharmacy reviewed the patient's [id number] medications for accuracy.    The list below reflects the updated PTA list. Comments regarding how patient may be taking medications differently can be found in the Admit Orders Activity  Prior to Admission Medications   Prescriptions Last Dose Informant   doxazosin (Cardura) 2 mg tablet  Self   Sig: Take 1 tablet (2 mg) by mouth once daily at bedtime.   finasteride (Proscar) 5 mg tablet  Self   Sig: Take 1 tablet (5 mg) by mouth once daily. Do not crush, chew, or split.   fluticasone (Flonase) 50 mcg/actuation nasal spray  Self   Sig: Administer 1 spray into each nostril once daily. Shake gently. Before first use, prime pump. After use, clean tip and replace cap.   furosemide (Lasix) 40 mg tablet  Self   Sig: Take 1 tablet (40 mg) by mouth 2 times a day.   lubricating eye drops ophthalmic solution  Self   Si drop if needed for dry eyes.   potassium chloride CR 20 mEq ER tablet  Self   Sig: Take 1 tablet (20 mEq) by mouth 2 times a day. Do not crush or chew.   simvastatin (Zocor) 20 mg tablet  Self   Sig: Take 1 tablet (20 mg) by mouth once daily at bedtime.      Facility-Administered Medications: None        Patient accepts M2B at discharge. Pharmacy has been updated to [facility] [place].    Sources:    -patient interview (ok historian, could confirm with prompting when [employer] pharmacy dispenses read off to him)  -outpatient pharmacy dispense history with [facility] Everywhere medication lists  -OARRS    Below are additional concerns with the patient's PTA list:     -potassium chloride 20meq tablets:  Last pharmacy dispense on  for #360 tablets / 90 day supply (4 tablets per day), but patient reporting only taking 1 tablet twice a day      Poncho Bradshaw, PharmD  Transitions of Care Pharmacist  [facility]  [facility]  Please reach out via Secure Chat for questions, or if no response call [place] or [facility]

## 2024-04-02 NOTE — CARE PLAN
The patient's goals for the shift include  free from falls and injury    The clinical goals for the shift include free from falls and injury

## 2024-04-02 NOTE — PROGRESS NOTES
04/02/24 1607   Discharge Planning   Living Arrangements Alone   Support Systems Family members;Friends/neighbors   Assistance Needed cane   Type of Residence Private residence   Number of Stairs to Enter Residence 2   Number of Stairs Within Residence 4   Financial Resource Strain   How hard is it for you to pay for the very basics like food, housing, medical care, and heating? Not hard   Housing Stability   In the last 12 months, was there a time when you were not able to pay the mortgage or rent on time? N   In the last 12 months, how many places have you lived? 1   In the last 12 months, was there a time when you did not have a steady place to sleep or slept in a shelter (including now)? N   Transportation Needs   In the past 12 months, has lack of transportation kept you from medical appointments or from getting medications? no   In the past 12 months, has lack of transportation kept you from meetings, work, or from getting things needed for daily living? No     Chemo Maldonado is a 89 y.o. male on day 1 of admission presenting with Atrial fibrillation, unspecified type (CMS/HCC).    SW consult - Discharge planning, SNF    LSW attempted to meet with pt at bedside. Pt is disoriented and unable to provide information. LSW attempted to reach two contacts from pt's chart. LSW called and [facility] and Brijesh reported prior to ED admission pt was fully independent. He reported pt would cook his own meals, drive himself, etc. He reported there were no issues. Brijesh reported someone sees pt regularly as Brijesh's mother lives on the other side of the [facility] and he is the one who found pt down at the home. LSW confirmed with Brijesh that Herber is HCPOA. LSW stated she would contact him as well to figure out next steps. Brijesh asked about pt being transferred to floor and LSW stated she would update them as they know but bed is pending. No further questions and LSW completed call. LSW attempted  to reach Herber (1st number did not dial out and the 2nd number went to ) LSW left  asking for return call.    LSW will continue to follow.    Update at 5:30PM - LSW spoke with Herber (nephew). He confirmed he is HCPOA. LSW explained SNF or Acute Rehab and he is open to whatever is recommended. He reported pt was fully functioning prior to ED admission and fall. LSW explained pt does have a bed ready and provided update on bed assignment. He reported he will ensure he talks to the new  tomorrow about everything. He also requested ALL calls go to him forward. LSW stated she would note this. No further questions. LSW completed call.     CHRISTY Flores, JOSE

## 2024-04-02 NOTE — PROGRESS NOTES
Chemo Maldonado is a 89 y.o. male on day 1 of admission presenting with Atrial fibrillation, unspecified type (CMS/HCC).    Subjective   OVN patient was restless, tried to pull out IV, talking to family members not in the room and removing dentures, and was given 25mg hydroxyzine.     Patient seen this am, complains of some soreness and muscle aches, but otherwise doing well. He is not oriented to place, but otherwise alert and oriented to person, time.       Objective     Physical Exam  Physical Exam  Constitutional:       Appearance: Normal appearance.   HENT:      Head: Normocephalic and atraumatic.   Cardiovascular:      Rate and Rhythm: Normal rate. Rhythm irregular.      Heart sounds: Normal heart sounds.   Pulmonary:      Effort: Pulmonary effort is normal.      Breath sounds: Normal breath sounds.   Abdominal:      General: Abdomen is flat. Bowel sounds are normal.      Palpations: Abdomen is soft.   Musculoskeletal:         General: Swelling present.      Cervical back: Normal range of motion. No tenderness.      Comments: 2+ BLE that patient reports has been present for 20+ years  Bruising on left shoulder    Skin:     General: Skin is warm and dry.   Neurological:      General: No focal deficit present.      Mental Status: He is alert and oriented to place, and time. He is not oriented to place.  Psychiatric:         Mood and Affect: Mood normal.         Behavior: Behavior normal.         Thought Content: Thought content normal.     Last Recorded Vitals  Blood pressure 136/81, pulse (!) 110, temperature 36.2 °C (97.2 °F), resp. rate 16, SpO2 99 %.  Intake/Output last 3 Shifts:  I/O last 3 completed shifts:  In: 1000 [IV Piggyback:1000]  Out: 550 [Urine:550]    Relevant Results              Results for orders placed or performed during the hospital encounter of 04/01/24 (from the past 24 hour(s))   Type And Screen   Result Value Ref Range    ABO TYPE O     Rh TYPE POS     ANTIBODY SCREEN NEG    Lactate   Result  Value Ref Range    Lactate 4.3 (HH) 0.4 - 2.0 mmol/L   Lactate   Result Value Ref Range    Lactate 1.6 0.4 - 2.0 mmol/L   Urinalysis with Reflex Culture and Microscopic   Result Value Ref Range    Color, Urine Light-Orange (N) Light-Yellow, Yellow, Dark-Yellow    Appearance, Urine Ex.Turbid (N) Clear    Specific Gravity, Urine >1.050 (N) 1.005 - 1.035    pH, Urine 6.0 5.0, 5.5, 6.0, 6.5, 7.0, 7.5, 8.0    Protein, Urine 100 (2+) (A) NEGATIVE, 10 (TRACE), 20 (TRACE) mg/dL    Glucose, Urine Normal Normal mg/dL    Blood, Urine 1.0 (3+) (A) NEGATIVE    Ketones, Urine NEGATIVE NEGATIVE mg/dL    Bilirubin, Urine NEGATIVE NEGATIVE    Urobilinogen, Urine Normal Normal mg/dL    Nitrite, Urine NEGATIVE NEGATIVE    Leukocyte Esterase, Urine 500 [place]/µL (A) NEGATIVE   Microscopic Only, Urine   Result Value Ref Range    WBC, Urine >50 (A) 1-5, NONE /HPF    WBC Clumps, Urine MODERATE Reference range not established. /HPF    RBC, Urine >20 (A) NONE, 1-2, 3-5 /HPF    Mucus, Urine 1+ Reference range not established. /LPF   Creatine Kinase   Result Value Ref Range    Creatine Kinase 5,877 (H) 0 - 325 U/L   TSH with reflex to Free T4 if abnormal   Result Value Ref Range    Thyroid Stimulating Hormone 1.91 0.44 - 3.98 mIU/L   CBC and Auto Differential   Result Value Ref Range    WBC 10.7 4.4 - 11.3 x10*3/uL    nRBC 0.0 0.0 - 0.0 /100 WBCs    RBC 4.85 4.50 - 5.90 x10*6/uL    Hemoglobin 14.0 13.5 - 17.5 g/dL    Hematocrit 42.7 41.0 - 52.0 %    MCV 88 80 - 100 fL    MCH 28.9 26.0 - 34.0 pg    MCHC 32.8 32.0 - 36.0 g/dL    RDW 13.5 11.5 - 14.5 %    Platelets 90 (L) 150 - 450 x10*3/uL    Immature Granulocytes %, Automated 0.4 0.0 - 0.9 %    Immature Granulocytes Absolute, Automated 0.04 0.00 - 0.50 x10*3/uL   Comprehensive metabolic panel   Result Value Ref Range    Glucose 104 (H) 74 - 99 mg/dL    Sodium 140 136 - 145 mmol/L    Potassium 4.1 3.5 - 5.3 mmol/L    Chloride 105 98 - 107 mmol/L    Bicarbonate 28 21 - 32 mmol/L    Anion Gap 11 10  - 20 mmol/L    Urea Nitrogen 25 (H) 6 - 23 mg/dL    Creatinine 1.10 0.50 - 1.30 mg/dL    eGFR 64 >60 mL/min/1.73m*2    Calcium 9.3 8.6 - 10.6 mg/dL    Albumin 3.0 (L) 3.4 - 5.0 g/dL    Alkaline Phosphatase 49 33 - 136 U/L    Total Protein 6.0 (L) 6.4 - 8.2 g/dL     (H) 9 - 39 U/L    Bilirubin, Total 1.1 0.0 - 1.2 mg/dL    ALT 70 (H) 10 - 52 U/L   Magnesium   Result Value Ref Range    Magnesium 1.86 1.60 - 2.40 mg/dL   Manual Differential   Result Value Ref Range    Neutrophils %, Manual 76.9 40.0 - 80.0 %    Bands %, Manual 1.7 0.0 - 5.0 %    Lymphocytes %, Manual 10.3 13.0 - 44.0 %    Monocytes %, Manual 11.1 2.0 - 10.0 %    Eosinophils %, Manual 0.0 0.0 - 6.0 %    Basophils %, Manual 0.0 0.0 - 2.0 %    Seg Neutrophils Absolute, Manual 8.23 (H) 1.60 - 5.00 x10*3/uL    Bands Absolute, Manual 0.18 0.00 - 0.50 x10*3/uL    Lymphocytes Absolute, Manual 1.10 0.80 - 3.00 x10*3/uL    Monocytes Absolute, Manual 1.19 (H) 0.05 - 0.80 x10*3/uL    Eosinophils Absolute, Manual 0.00 0.00 - 0.40 x10*3/uL    Basophils Absolute, Manual 0.00 0.00 - 0.10 x10*3/uL    Total Cells Counted 117     Neutrophils Absolute, Manual 8.41 (H) 1.60 - 5.50 x10*3/uL    RBC Morphology See Below     Target Cells Few     Ovalocytes Few     [place] Cells Few             Assessment/Plan   Principal Problem:    Atrial fibrillation, unspecified type (CMS/HCC)    Chemo Maldonado is an 89 y.o. male with hx of HTN, HLD, and BPH presenting to [facility] by [place] following a fall going down his stairs. Patient had been laying on the floor since 10pm last night until he was brought in by EMS. Labs concerning for elevated CK, elevated lactate, elevated AST and chronic thrombocytopenia. Patients presentation is consistent with rhabdomyolysis in the setting of his mechanical fall and prolonged immobilization. Patient also with new a-fib that is rate controlled.    4/02 Updates  - ceftriaxone for UTI   - Echo to rule out structural causes for a-fib  - continue IV fluids at  200cc/hr  - Speech eval   - Thiamine 500mg TID      # Rhabdomyolysis   :: Elevated CK level 8313-> down to 5877  :: Elevated Lactate 3.3--> 4.3--> 1.6  :: elevated transaminases    :: myalgias, patient down for several hours  :: No evidence of AARON or hemolysis   Plan:   - Continue 200cc/hr sodium chloride infusion   - Tylenol to myalgias   - RFP BID and replete electrolytes as needed   - will hold simvastatin in setting of elevated transaminases      # new onset a-fib, rate controlled   :: patient reports feeling fluttering in chest at night that is paroxysmal in nature   :: Rate controlled   :: [id number] Score of 3  :: TSH wnl  Plan:   - Repeat EKG   - Echo today to rule out valvular disease or cardiac causes of new onset a-fib   - Replete electrolytes as needed      #Fall  :: likely mechanical in nature, given no LOC, no Hx of cardiac issues   Plan:  - consult PT/OT     # BPH  # Bladder lesion   # UTI  :: There is a 1.9 x 2.9 cm enhancing lesion along the posterior wall of  the bladder   :: No reported hematuria  :: UA positive for 3+ blood, >20 RBC; + leukocyte esterase and >50 WBC  Plan:   - follow up UCx   - Start ceftriaxone 1g for UTI  - Follows with urology outpatient for BPH, will schedule follow up for lesion   - continue home finasteride  - Will hold home doxazosin     #HTN  - Will hold home lisinopril and furosemide in the setting of treatment for rhabdo, current blood pressures with systolics in 120-130s    #Delirium   - patient disoriented to place, likely sundowning   - Started IV Thiamine in the setting of questionable alcohol use   - delirium precautions, frequent reorientation, OOB and into chair     #Lymphedema   :: Patient stated had this for 20 years   :: slightly more edematous than baseline   :: BLE venous duplex negative for DVT  Plan:   - Compression stockings      #Thrombocytopenia   :: Platelets 85 on admission, with baseline between   :: Thought to be secondary to benign  ethnic leukopenia, ITP, or early MDS   :: Has been seen in outpatient hematology clinic for stable chronic thrombocytopenia with plan to repeat CBC in 1 year with consideration for BMBx if continues to decrease  Plan:  - Will monitor and ensure outpatient hematology follow-up     F: NS 200cc/hr  E: replete as needed  N: regular diet     DVT ppx: lovenox  GI ppx: not indicated     Access: PIV     Code status: DNR/DNI   NOK: Herber (nephew) [phone]              Mayuri Nascimento MD

## 2024-04-02 NOTE — PROGRESS NOTES
Speech-Language Pathology  Adult Inpatient Clinical Bedside Swallow Evaluation    Patient Name: Chemo Maldonado  MRN: [id number]  Today's Date: 4/2/2024   Start Time: 1430  Stop Time: 1500  Time Calculation (min): 0    History of Present Illness:   Patient is 89 year old male who presents to ED after an unwitnessed fall on stairs.  Patient denies headstrike/LOC.  Patient was down overnight, neighbor called elvira to assist with calling EMS.      Assessment:   Clinical bedside swallow evaluation completed.     Patient awake, alert, disoriented to location, time, and situation.  Nephew present for duration of evaluation.  Redirections required throughout.      Unable to fully assess oral motor function.  Dentures upper, removed and cleaned.  Patient has lower dentures that are not present, nephew states they are at home.  Tongue coated and dry in appearance.  Speech is slightly dysarthric.  Noted to have left gaze preference throughout evaluation.  All imaging thus far negative for acute processes.     Ice chips presented with no clinical s/s of aspiration throughout duration of study.  Unable to hold cup and feed self; clinician assist throughout.  Cup rim trial resulting in immediate coughing episode.  Thin liquids by straw with no overt s/s of aspiration.  Unable to complete 3oz water test due to mentation; however, patient was observed to take sequential sips (~4) without clinical s/s of aspiration.      Attempted puree solids with patient intermittently accepting bolus.  Trials were too limited for full assessment and recommendations of oral diet.  Will continue to assess ability to advance diet.       Recommendations:  NPO  Okay for ice chips and sips of water  Oral care prior to administration  No more than 4-5 ice chips per hour  Only when RN present  Patient must be upright for all intake      Goal:   Patient will tolerate safest and least restrictive diet 100% of the time without clinical s/s of aspiration during  length of stay         Plan:  SLP Services Indicated: Yes  Frequency: 2x week  Discussed POC with patient and nephew  SLP - OK to Discharge    Pain:   0-10  0 = No pain.     Inpatient Education:  Extensive education provided to patient and nephew  regarding current swallow function, recommendations/results, and POC.      Consultations/Referrals/Coordination of Services:   N/A

## 2024-04-03 ENCOUNTER — APPOINTMENT (OUTPATIENT)
Dept: CARDIOLOGY | Facility: HOSPITAL | Age: 89
DRG: 558 | End: 2024-04-03
Payer: MEDICARE

## 2024-04-03 LAB
ALBUMIN SERPL BCP-MCNC: 2.6 G/DL (ref 3.4–5)
ANION GAP SERPL CALC-SCNC: 12 MMOL/L (ref 10–20)
BASOPHILS # BLD AUTO: 0.01 X10*3/UL (ref 0–0.1)
BASOPHILS NFR BLD AUTO: 0.1 %
BUN SERPL-MCNC: 24 MG/DL (ref 6–23)
CALCIUM SERPL-MCNC: 8.2 MG/DL (ref 8.6–10.6)
CHLORIDE SERPL-SCNC: 110 MMOL/L (ref 98–107)
CK SERPL-CCNC: 3043 U/L (ref 0–325)
CO2 SERPL-SCNC: 25 MMOL/L (ref 21–32)
CREAT SERPL-MCNC: 0.85 MG/DL (ref 0.5–1.3)
EGFRCR SERPLBLD CKD-EPI 2021: 83 ML/MIN/1.73M*2
EOSINOPHIL # BLD AUTO: 0.05 X10*3/UL (ref 0–0.4)
EOSINOPHIL NFR BLD AUTO: 0.6 %
ERYTHROCYTE [DISTWIDTH] IN BLOOD BY AUTOMATED COUNT: 13.5 % (ref 11.5–14.5)
GLUCOSE SERPL-MCNC: 78 MG/DL (ref 74–99)
HCT VFR BLD AUTO: 34.2 % (ref 41–52)
HGB BLD-MCNC: 11.7 G/DL (ref 13.5–17.5)
IMM GRANULOCYTES # BLD AUTO: 0.03 X10*3/UL (ref 0–0.5)
IMM GRANULOCYTES NFR BLD AUTO: 0.3 % (ref 0–0.9)
LYMPHOCYTES # BLD AUTO: 0.63 X10*3/UL (ref 0.8–3)
LYMPHOCYTES NFR BLD AUTO: 7.3 %
MAGNESIUM SERPL-MCNC: 2.06 MG/DL (ref 1.6–2.4)
MCH RBC QN AUTO: 30.2 PG (ref 26–34)
MCHC RBC AUTO-ENTMCNC: 34.2 G/DL (ref 32–36)
MCV RBC AUTO: 88 FL (ref 80–100)
MONOCYTES # BLD AUTO: 0.86 X10*3/UL (ref 0.05–0.8)
MONOCYTES NFR BLD AUTO: 9.9 %
NEUTROPHILS # BLD AUTO: 7.1 X10*3/UL (ref 1.6–5.5)
NEUTROPHILS NFR BLD AUTO: 81.8 %
NRBC BLD-RTO: 0 /100 WBCS (ref 0–0)
PHOSPHATE SERPL-MCNC: 2.1 MG/DL (ref 2.5–4.9)
PLATELET # BLD AUTO: 89 X10*3/UL (ref 150–450)
POTASSIUM SERPL-SCNC: 3.7 MMOL/L (ref 3.5–5.3)
RBC # BLD AUTO: 3.88 X10*6/UL (ref 4.5–5.9)
SODIUM SERPL-SCNC: 143 MMOL/L (ref 136–145)
WBC # BLD AUTO: 8.7 X10*3/UL (ref 4.4–11.3)

## 2024-04-03 PROCEDURE — 2500000005 HC RX 250 GENERAL PHARMACY W/O HCPCS

## 2024-04-03 PROCEDURE — 97165 OT EVAL LOW COMPLEX 30 MIN: CPT | Mod: GO

## 2024-04-03 PROCEDURE — 97162 PT EVAL MOD COMPLEX 30 MIN: CPT | Mod: GP | Performed by: PHYSICAL THERAPIST

## 2024-04-03 PROCEDURE — 2500000004 HC RX 250 GENERAL PHARMACY W/ HCPCS (ALT 636 FOR OP/ED): Performed by: STUDENT IN AN ORGANIZED HEALTH CARE EDUCATION/TRAINING PROGRAM

## 2024-04-03 PROCEDURE — 2500000001 HC RX 250 WO HCPCS SELF ADMINISTERED DRUGS (ALT 637 FOR MEDICARE OP)

## 2024-04-03 PROCEDURE — 36415 COLL VENOUS BLD VENIPUNCTURE: CPT

## 2024-04-03 PROCEDURE — 80069 RENAL FUNCTION PANEL: CPT

## 2024-04-03 PROCEDURE — 82550 ASSAY OF CK (CPK): CPT

## 2024-04-03 PROCEDURE — 92526 ORAL FUNCTION THERAPY: CPT | Mod: GN

## 2024-04-03 PROCEDURE — 85025 COMPLETE CBC W/AUTO DIFF WBC: CPT

## 2024-04-03 PROCEDURE — 2500000001 HC RX 250 WO HCPCS SELF ADMINISTERED DRUGS (ALT 637 FOR MEDICARE OP): Performed by: STUDENT IN AN ORGANIZED HEALTH CARE EDUCATION/TRAINING PROGRAM

## 2024-04-03 PROCEDURE — 83735 ASSAY OF MAGNESIUM: CPT

## 2024-04-03 PROCEDURE — 2500000004 HC RX 250 GENERAL PHARMACY W/ HCPCS (ALT 636 FOR OP/ED): Mod: MUE

## 2024-04-03 PROCEDURE — 2500000002 HC RX 250 W HCPCS SELF ADMINISTERED DRUGS (ALT 637 FOR MEDICARE OP, ALT 636 FOR OP/ED): Mod: MUE

## 2024-04-03 PROCEDURE — 99233 SBSQ HOSP IP/OBS HIGH 50: CPT

## 2024-04-03 PROCEDURE — 1200000002 HC GENERAL ROOM WITH TELEMETRY DAILY

## 2024-04-03 RX ORDER — ACETAMINOPHEN 325 MG/1
975 TABLET ORAL EVERY 8 HOURS PRN
Qty: 30 TABLET | Refills: 0
Start: 2024-04-03

## 2024-04-03 RX ORDER — METOPROLOL TARTRATE 25 MG/1
12.5 TABLET, FILM COATED ORAL EVERY 12 HOURS
Start: 2024-04-03

## 2024-04-03 RX ORDER — FOLIC ACID 1 MG/1
1 TABLET ORAL DAILY
Start: 2024-04-04

## 2024-04-03 RX ORDER — LIDOCAINE 560 MG/1
1 PATCH PERCUTANEOUS; TOPICAL; TRANSDERMAL DAILY PRN
Start: 2024-04-03

## 2024-04-03 RX ORDER — LIDOCAINE 560 MG/1
1 PATCH PERCUTANEOUS; TOPICAL; TRANSDERMAL DAILY
Status: DISCONTINUED | OUTPATIENT
Start: 2024-04-03 | End: 2024-04-06 | Stop reason: HOSPADM

## 2024-04-03 RX ADMIN — CEFTRIAXONE SODIUM 1 G: 1 INJECTION, SOLUTION INTRAVENOUS at 11:05

## 2024-04-03 RX ADMIN — SIMVASTATIN 20 MG: 20 TABLET, FILM COATED ORAL at 20:41

## 2024-04-03 RX ADMIN — LIDOCAINE 1 PATCH: 4 PATCH TOPICAL at 13:27

## 2024-04-03 RX ADMIN — THIAMINE HYDROCHLORIDE 500 MG: 100 INJECTION, SOLUTION INTRAMUSCULAR; INTRAVENOUS at 11:06

## 2024-04-03 RX ADMIN — THIAMINE HYDROCHLORIDE 500 MG: 100 INJECTION, SOLUTION INTRAMUSCULAR; INTRAVENOUS at 20:41

## 2024-04-03 RX ADMIN — ENOXAPARIN SODIUM 40 MG: 100 INJECTION SUBCUTANEOUS at 20:41

## 2024-04-03 RX ADMIN — METOPROLOL TARTRATE 12.5 MG: 25 TABLET, FILM COATED ORAL at 19:35

## 2024-04-03 RX ADMIN — THIAMINE HYDROCHLORIDE 500 MG: 100 INJECTION, SOLUTION INTRAMUSCULAR; INTRAVENOUS at 15:50

## 2024-04-03 RX ADMIN — ACETAMINOPHEN 975 MG: 325 TABLET ORAL at 11:15

## 2024-04-03 RX ADMIN — FOLIC ACID 1 MG: 1 TABLET ORAL at 10:51

## 2024-04-03 RX ADMIN — SODIUM CHLORIDE 200 ML/HR: 9 INJECTION, SOLUTION INTRAVENOUS at 00:40

## 2024-04-03 RX ADMIN — FINASTERIDE 5 MG: 5 TABLET, FILM COATED ORAL at 10:51

## 2024-04-03 ASSESSMENT — COGNITIVE AND FUNCTIONAL STATUS - GENERAL
WALKING IN HOSPITAL ROOM: TOTAL
MOVING TO AND FROM BED TO CHAIR: TOTAL
HELP NEEDED FOR BATHING: A LOT
STANDING UP FROM CHAIR USING ARMS: TOTAL
TURNING FROM BACK TO SIDE WHILE IN FLAT BAD: TOTAL
DRESSING REGULAR LOWER BODY CLOTHING: TOTAL
DRESSING REGULAR LOWER BODY CLOTHING: TOTAL
MOBILITY SCORE: 6
STANDING UP FROM CHAIR USING ARMS: TOTAL
MOBILITY SCORE: 6
DAILY ACTIVITIY SCORE: 13
CLIMB 3 TO 5 STEPS WITH RAILING: TOTAL
MOVING FROM LYING ON BACK TO SITTING ON SIDE OF FLAT BED WITH BEDRAILS: TOTAL
CLIMB 3 TO 5 STEPS WITH RAILING: TOTAL
HELP NEEDED FOR BATHING: A LOT
MOVING FROM LYING ON BACK TO SITTING ON SIDE OF FLAT BED WITH BEDRAILS: TOTAL
MOBILITY SCORE: 6
PERSONAL GROOMING: A LITTLE
WALKING IN HOSPITAL ROOM: TOTAL
DRESSING REGULAR UPPER BODY CLOTHING: A LOT
MOVING TO AND FROM BED TO CHAIR: TOTAL
TURNING FROM BACK TO SIDE WHILE IN FLAT BAD: TOTAL
DRESSING REGULAR UPPER BODY CLOTHING: A LOT
TOILETING: TOTAL
MOVING FROM LYING ON BACK TO SITTING ON SIDE OF FLAT BED WITH BEDRAILS: TOTAL
STANDING UP FROM CHAIR USING ARMS: TOTAL
CLIMB 3 TO 5 STEPS WITH RAILING: TOTAL
TURNING FROM BACK TO SIDE WHILE IN FLAT BAD: TOTAL
DAILY ACTIVITIY SCORE: 13
WALKING IN HOSPITAL ROOM: TOTAL
TOILETING: TOTAL
PERSONAL GROOMING: A LITTLE
MOVING TO AND FROM BED TO CHAIR: TOTAL

## 2024-04-03 ASSESSMENT — PAIN SCALES - GENERAL
PAINLEVEL_OUTOF10: 9
PAINLEVEL_OUTOF10: 8

## 2024-04-03 ASSESSMENT — PAIN - FUNCTIONAL ASSESSMENT
PAIN_FUNCTIONAL_ASSESSMENT: 0-10
PAIN_FUNCTIONAL_ASSESSMENT: 0-10

## 2024-04-03 ASSESSMENT — ACTIVITIES OF DAILY LIVING (ADL)
BATHING_ASSISTANCE: MAXIMAL
ADL_ASSISTANCE: INDEPENDENT

## 2024-04-03 NOTE — PROGRESS NOTES
Occupational Therapy    Evaluation    Patient Name: Chemo Maldonado  MRN: [id number]  Today's Date: 4/3/2024  Time Calculation  Start Time: 0944  Stop Time: 1006  Time Calculation (min): 22 min      Assessment:  OT Assessment: Pt would benefit from continued skilled OT to increase independence in ADLs, functional mobility, activity tolerance, and safety awareness.  Prognosis: Good  Barriers to Discharge: None  Evaluation/Treatment Tolerance: Patient tolerated treatment well  Medical Staff Made Aware: Yes  End of Session Communication: Bedside nurse  End of Session Patient Position: Bed, 3 rail up, Alarm off, not on at start of session  OT Assessment Results: Decreased ADL status, Decreased upper extremity range of motion, Decreased upper extremity strength, Decreased safe judgment during ADL, Decreased endurance, Decreased fine motor control, Decreased functional mobility, Decreased gross motor control  Prognosis: Good  Barriers to Discharge: None  Evaluation/Treatment Tolerance: Patient tolerated treatment well  Medical Staff Made Aware: Yes  Strengths: Attitude of self  Barriers to Participation: Comorbidities  Plan:  Treatment Interventions: ADL retraining, Functional transfer training, UE strengthening/ROM, Endurance training, Patient/family training, Equipment evaluation/education, Neuromuscular reeducation, Fine motor coordination activities, Compensatory technique education  OT Frequency: 3 times per week  OT Discharge Recommendations: Moderate intensity level of continued care  Equipment Recommended upon Discharge: Wheeled walker  OT Recommended Transfer Status: Assist of 2  OT - OK to Discharge: Yes (upon medical clearance)  Treatment Interventions: ADL retraining, Functional transfer training, UE strengthening/ROM, Endurance training, Patient/family training, Equipment evaluation/education, Neuromuscular reeducation, Fine motor coordination activities, Compensatory technique education    Subjective   Current  Problem:  1. Atrial fibrillation, unspecified type (CMS/HCC)  Transthoracic Echo (TTE) Complete    Transthoracic Echo (TTE) Complete      2. Traumatic rhabdomyolysis, initial encounter (CMS/HCC)        3. 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, early MDS)  Family/Caregiver Present: No  Co-Treatment: PT  Co-Treatment Reason: to maximize pt safety with mobility  Prior to Session Communication: Bedside nurse  Patient Position Received: Bed, 3 rail up, Alarm off, not on at start of session  General Comment: Pt supine in bed upon arrival, B mitts donned, agreeable to OT  Precautions:  Medical Precautions: Fall precautions    Pain:  Pain Assessment  Pain Assessment: 0-10  Pain Score:  (reporting cramping in BLE, did not rate)  Pain Location: Leg  Pain Interventions: Repositioned, Distraction, Elevated    Objective   Cognition:  Orientation Level: Oriented X4  Problem Solving: Exceptions to WFL  Safety/Judgement: Exceptions to WFL  Complex Functional Tasks: Minimal  Insight: Mild     Home Living:  Type of Home: House (duplex)  Lives With: Alone  Home Adaptive Equipment: Cane  Home Layout: Laundry in basement, Multi-level  Home Access: Stairs to enter with rails  Entrance Stairs-Number of Steps: 4  Bathroom Shower/Tub: Tub/shower unit  Bathroom Equipment: Grab bars in shower  Prior Function:  Level of [place]: Independent with ADLs and functional transfers, Independent with homemaking with ambulation  ADL Assistance: Independent  Ambulatory Assistance: Independent (reports has been using cane recently)  Prior Function Comments: (+) 2 recent falls  IADL History:  Homemaking Responsibilities: Yes  Meal Prep Responsibility:  Primary  Laundry Responsibility: Primary  Cleaning Responsibility: Primary  Bill Paying/Finance Responsibility: Primary  Shopping Responsibility: Primary  Current License: Yes  Mode of Transportation: Car  ADL:  Eating Assistance: Independent (anticipated)  Grooming Assistance: Stand by (wiped eyes/face with washcloth with setup seated EOB SBA)  Bathing Assistance: Maximal (anticipated)  UE Dressing Assistance: Moderate (mod A to adjust gown over back seated EOB)  LE Dressing Assistance: Maximal (anticipated)  Toileting Assistance with Device: Maximal (anticipated)  Activity Tolerance:  Endurance: Tolerates less than 10 min exercise with changes in vital signs  Bed Mobility/Transfers: Bed Mobility  Bed Mobility: Yes  Bed Mobility 1  Bed Mobility 1: Supine to sitting, Sitting to supine  Level of Assistance 1: Maximum assistance (x2)  Bed Mobility Comments 1: HOB elevated, use of drawsheet    Transfers  Transfer: No    Functional Mobility:  Functional Mobility  Functional Mobility Performed: No  Sitting Balance:  Static Sitting Balance  Static Sitting-Balance Support: Feet supported, Right upper extremity supported  Static Sitting-Level of Assistance: Contact guard  Dynamic Sitting Balance  Dynamic Sitting-Balance Support: No upper extremity supported  Dynamic Sitting-Comments: max A  Standing Balance:  Static Standing Balance  Static Standing-Comment/Number of Minutes:  (TBD)  Dynamic Standing Balance  Dynamic Standing-Comments:  (TBD)   Modalities:  Modalities Used: No  Vision:Vision - Basic Assessment  Current Vision: Wears glasses only for reading  Sensation:  Light Touch: No apparent deficits  Strength:  Strength Comments: RUE shoulder flexion ~ 0-20 AROM, all other jts WFL, LUE WFL  Perception:  Inattention/Neglect: Appears intact  Coordination:  Movements are Fluid and Coordinated: Yes   Hand Function:  Gross Grasp: Functional  Coordination: Functional  Extremities: RUE   RUE : Exceptions to WFL  RUE AROM  (degrees)  RUE AROM Comment: RUE shoulder flexion ~ 0-20 AROM, all other jts WFL and LUE   LUE: Within Functional Limits    Outcome Measures:[facility] Daily Activity  Putting on and taking off regular lower body clothing: Total  Bathing (including washing, rinsing, drying): A lot  Putting on and taking off regular upper body clothing: A lot  Toileting, which includes using toilet, bedpan or urinal: Total  Taking care of personal grooming such as brushing teeth: A little  Eating Meals: None  Daily Activity - Total Score: 13     and OT Adult Other Outcome Measures  4AT: negative    Education Documentation  Body Mechanics, taught by Gera Keenan OT at 4/3/2024 11:47 AM.  Learner: Patient  Readiness: Acceptance  Method: Explanation  Response: Verbalizes Understanding    Precautions, taught by Gera Keenan OT at 4/3/2024 11:47 AM.  Learner: Patient  Readiness: Acceptance  Method: Explanation  Response: Verbalizes Understanding    ADL Training, taught by Gera Keenan OT at 4/3/2024 11:47 AM.  Learner: Patient  Readiness: Acceptance  Method: Explanation  Response: Verbalizes Understanding    Education Comments  No comments found.        Goals:  Encounter Problems       Encounter Problems (Active)       ADLs       Patient with complete upper body dressing with modified independent level of assistance donning and doffing all UE clothes with PRN adaptive equipment while supported sitting (Progressing)       Start:  04/03/24    Expected End:  04/24/24            Patient will complete daily grooming tasks brushing teeth and washing face/hair with independent level of assistance and PRN adaptive equipment while supported sitting. (Progressing)       Start:  04/03/24    Expected End:  04/24/24               BALANCE       Patientt will maintain static standing balance during ADL task with minimal assist  level of assistance drop down in order to demonstrate decreased risk of falling and improved postural control. (Not  Progressing)       Start:  04/03/24    Expected End:  04/24/24               COGNITION/SAFETY       Patient will score WFL on standardized cognitive assessment with verbal cues and within reasonable time frame (Progressing)       Start:  04/03/24    Expected End:  04/24/24               TRANSFERS       Patient will complete sit to stand transfer with moderate assist level of assistance and least restrictive device in order to improve safety and prepare for out of bed mobility. (Not Progressing)       Start:  04/03/24    Expected End:  04/24/24                  BRYCE Littlejohn/L

## 2024-04-03 NOTE — PROGRESS NOTES
Chemo Maldonado is a 89 y.o. male on day 2 of admission presenting with Atrial fibrillation, unspecified type (CMS/HCC).    Transitional Care Coordination Progress Note:  Patient discussed during interdisciplinary rounds.   Team members present: MD and TCC  Plan per Medical/Surgical team: Treating UTI and Lymphedema, On CIWA protocol.  Payor: [employer]-Medicare and [employer]  Discharge disposition: SNF, Waiting on Nephew for FOC, [facility] [place]. Nephew wanted facilities closer to [place].  Potential Barriers: None  ADOD: 04/06/24    RADHA GONZALEZ

## 2024-04-03 NOTE — PROGRESS NOTES
Speech-Language Pathology  Adult Inpatient Swallow Treatment    Patient Name: Chemo Maldonado  MRN: [id number]  Today's Date: 4/3/2024   Start Time: 0900  Stop Time: 0930  Time Calculation (min): 30    Impression:   In to see patient to assess ability to progress diet.  Patient currently ice chips and sips of thin with RN present.     Patient awakens to vocal stimuli, keeps eyes closed throughout most of session, but interactive and alert.  Oriented to person and place, uncertain of situation. Endorses bilateral groin pain.  Labs positive for UTI.  RN and MD notified of discomfort.      Oral care provided via swab and suction.  Patient observed to have thick, stringy secretions between tongue and roof of mouth posteriorly and anteriorly.  Effectively removed.   Strong volitional cough.      Patient requiring verbal cues for effectively drawing thin liquids up through straw.  No overt s/s of aspiration throughout all trials, patient consuming 4oz.  Puree solids consumed with observed disorganized oral stage and lingual residue after swallow.  Effectively cleared with thin liquid wash.      With improvement in participation and mentation, patient is safe to initiate oral diet at this time.      Recommendations:  Puree solids  Thin liquids  Upright for all PO intake  Small bites/sips  Straws ok  Medication crushed in purees  1:1 feeding assistance  Feed only when alert  Alternate food and liquids  Oral care after all po intake - remove dentures and clean nightly    Goal:   Patient will tolerate safest and least restrictive diet 100% of the time without clinical s/s of aspiration during length of stay          Plan:  SLP Services Indicated: Yes  Frequency: 2x week  Discussed POC with patient  SLP - OK to Discharge    Pain:   0-10  0 = No pain.     Inpatient Education:  Extensive education provided to patient regarding current swallow function, recommendations/results, and POC.      Consultations/Referrals/Coordination of  Services:   N/A

## 2024-04-03 NOTE — CARE PLAN
The patient's goals for the shift include      The clinical goals for the shift include Patient will remain hemodynamically stable this shift.      Problem: Pain  Goal: My pain/discomfort is manageable  4/3/2024 0647 by Rei Castle RN  Outcome: Progressing  4/3/2024 0647 by Rei Castle RN  Outcome: Progressing  4/3/2024 0646 by Rei Castle RN  Outcome: Progressing     Problem: Safety  Goal: Patient will be injury free during hospitalization  4/3/2024 0647 by Rei Castle RN  Outcome: Progressing  4/3/2024 0647 by Rei Castle RN  Outcome: Progressing  4/3/2024 0646 by Rei Castle RN  Outcome: Progressing  Goal: I will remain free of falls  4/3/2024 0647 by Rei Castle RN  Outcome: Progressing  4/3/2024 0647 by Rei Castle RN  Outcome: Progressing  4/3/2024 0646 by Rei Castle RN  Outcome: Progressing     Problem: Daily Care  Goal: Daily care needs are met  4/3/2024 0647 by Rei Castle RN  Outcome: Progressing  4/3/2024 0647 by Rei Castle RN  Outcome: Progressing  4/3/2024 0646 by Rei Castle RN  Outcome: Progressing     Problem: Psychosocial Needs  Goal: Demonstrates ability to cope with hospitalization/illness  4/3/2024 0647 by Rei Castle RN  Outcome: Progressing  4/3/2024 0647 by Rei Castle RN  Outcome: Progressing  4/3/2024 0646 by Rei Castle RN  Outcome: Progressing  Goal: Collaborate with me, my family, and caregiver to identify my specific goals  4/3/2024 0647 by Rei Castle RN  Outcome: Progressing  4/3/2024 0647 by Rei Castle RN  Outcome: Progressing  4/3/2024 0646 by Rei Castle RN  Outcome: Progressing     Problem: Discharge Barriers  Goal: My discharge needs are met  4/3/2024 0647 by Rei Castle RN  Outcome: Progressing  4/3/2024 0647 by Rei Castle RN  Outcome: Progressing  4/3/2024 0646 by Rei Castle RN  Outcome: Progressing     Problem: Safety - Medical  Restraint  Goal: Remains free of injury from restraints (Restraint for Interference with Medical Device)  4/3/2024 0647 by Rei Castle RN  Outcome: Progressing  4/3/2024 0647 by Rei Castle RN  Outcome: Progressing  4/3/2024 0646 by Rei Castle RN  Outcome: Progressing  Goal: Free from restraint(s) (Restraint for Interference with Medical Device)  4/3/2024 0647 by Rei Castle RN  Outcome: Progressing  4/3/2024 0647 by Rei Castle RN  Outcome: Progressing  4/3/2024 0646 by Rei Castle RN  Outcome: Progressing     Problem: Skin  Goal: Prevent/manage excess moisture  4/3/2024 0648 by Rei Castle RN  Flowsheets (Taken 4/3/2024 0648)  Prevent/manage excess moisture: Moisturize dry skin  4/3/2024 0647 by Rei Castle RN  Outcome: Progressing  4/3/2024 0647 by Rei Castle RN  Outcome: Progressing  Goal: Prevent/minimize sheer/friction injuries  4/3/2024 0648 by Rei Castle RN  Flowsheets (Taken 4/3/2024 0648)  Prevent/minimize sheer/friction injuries: Use pull sheet  4/3/2024 0647 by Rei Castle RN  Outcome: Progressing  4/3/2024 0647 by Rei Castle RN  Outcome: Progressing  Goal: Promote/optimize nutrition  4/3/2024 0648 by Rei Castle RN  Flowsheets (Taken 4/3/2024 0648)  Promote/optimize nutrition: Monitor/record intake including meals  4/3/2024 0647 by Rei Castle RN  Outcome: Progressing  4/3/2024 0647 by Rei Castle RN  Outcome: Progressing  Goal: Promote skin healing  4/3/2024 0648 by Rei Castle RN  Flowsheets (Taken 4/3/2024 0648)  Promote skin healing: Turn/reposition every 2 hours/use positioning/transfer devices  4/3/2024 0647 by Rei Castle RN  Outcome: Progressing  4/3/2024 0647 by Rie Castle RN  Outcome: Progressing

## 2024-04-03 NOTE — CARE PLAN
The patient's goals for the shift include        Problem: Pain  Goal: My pain/discomfort is manageable  Outcome: Progressing     Problem: Safety  Goal: Patient will be injury free during hospitalization  Outcome: Progressing  Goal: I will remain free of falls  Outcome: Progressing     Problem: Daily Care  Goal: Daily care needs are met  Outcome: Progressing     Problem: Psychosocial Needs  Goal: Demonstrates ability to cope with hospitalization/illness  Outcome: Progressing  Goal: Collaborate with me, my family, and caregiver to identify my specific goals  Outcome: Progressing     Problem: Discharge Barriers  Goal: My discharge needs are met  Outcome: Progressing     Problem: Safety - Medical Restraint  Goal: Remains free of injury from restraints (Restraint for Interference with Medical Device)  Outcome: Progressing  Goal: Free from restraint(s) (Restraint for Interference with Medical Device)  Outcome: Progressing     Problem: Skin  Goal: Prevent/manage excess moisture  Outcome: Progressing  Flowsheets (Taken 4/3/2024 1430)  Prevent/manage excess moisture: Moisturize dry skin  Goal: Prevent/minimize sheer/friction injuries  Outcome: Progressing  Flowsheets (Taken 4/3/2024 1430)  Prevent/minimize sheer/friction injuries: Utilize specialty bed per algorithm  Goal: Promote/optimize nutrition  Outcome: Progressing  Flowsheets (Taken 4/3/2024 1430)  Promote/optimize nutrition: Assist with feeding  Goal: Promote skin healing  Outcome: Progressing  Flowsheets (Taken 4/3/2024 1430)  Promote skin healing: Turn/reposition every 2 hours/use positioning/transfer devices

## 2024-04-03 NOTE — PROGRESS NOTES
"Chemo Maldonado is a 89 y.o. male on day 2 of admission presenting with Atrial fibrillation, unspecified type (CMS/HCC).    Subjective   Patient seen this am, complains of some soreness and muscle aches, but otherwise doing well. He is more alert than yesterday, oriented. Patient is calm without any distress.     Objective     Physical Exam  Physical Exam  Constitutional:       Appearance: Normal appearance.   HENT:      Head: Normocephalic and atraumatic.   Cardiovascular:      Rate and Rhythm: Normal rate. Rhythm irregular.      Heart sounds: Normal heart sounds.   Pulmonary:      Effort: Pulmonary effort is normal.      Breath sounds: Normal breath sounds.   Abdominal:      General: Abdomen is flat. Bowel sounds are normal.      Palpations: Abdomen is soft.   Musculoskeletal:         General: Swelling present.      Cervical back: Normal range of motion. No tenderness.      Comments: 2+ BLE that patient reports has been present for 20+ years  Bruising on left shoulder    Skin:     General: Skin is warm and dry.   Neurological:      General: No focal deficit present.      Mental Status: He is alert and oriented to place, and time. He is not oriented to place.  Psychiatric:         Mood and Affect: Mood normal.         Behavior: Behavior normal.         Thought Content: Thought content normal.     Last Recorded Vitals  Blood pressure 120/66, pulse (!) 113, temperature 37.4 °C (99.3 °F), resp. rate 22, height 1.854 m (6' 1\"), weight 129 kg (285 lb), SpO2 98 %.  Intake/Output last 3 Shifts:  I/O last 3 completed shifts:  In: 1000 (7.7 mL/kg) [IV Piggyback:1000]  Out: 550 (4.3 mL/kg) [Urine:550 (0.1 mL/kg/hr)]  Weight: 129.3 kg     Relevant Results              No results found for this or any previous visit (from the past 24 hour(s)).  Scheduled medications  cefTRIAXone, 1 g, intravenous, q24h  enoxaparin, 40 mg, subcutaneous, q24h  finasteride, 5 mg, oral, Daily  fluticasone, 2 spray, Each Nostril, Daily  folic acid, 1 " Advised against air travel while intraocular gas bubble is present, 6 weeks is expected period but can last longer.  NO flat on back with gas bubble in, vision will be poor with gas bubble in.  Retina is nerve tissue and takes time to heal.  Vision will be poor with gas bubble in, pt advised of multiple follow up visits and post surgical activity restrictions. mg, oral, Daily  metoprolol tartrate, 12.5 mg, oral, q12h  simvastatin, 20 mg, oral, Nightly  thiamine, 500 mg, intravenous, TID      Continuous medications     PRN medications  PRN medications: acetaminophen  Results for orders placed or performed during the hospital encounter of 04/01/24 (from the past 24 hour(s))   CBC and Auto Differential   Result Value Ref Range    WBC 8.7 4.4 - 11.3 x10*3/uL    nRBC 0.0 0.0 - 0.0 /100 WBCs    RBC 3.88 (L) 4.50 - 5.90 x10*6/uL    Hemoglobin 11.7 (L) 13.5 - 17.5 g/dL    Hematocrit 34.2 (L) 41.0 - 52.0 %    MCV 88 80 - 100 fL    MCH 30.2 26.0 - 34.0 pg    MCHC 34.2 32.0 - 36.0 g/dL    RDW 13.5 11.5 - 14.5 %    Platelets 89 (L) 150 - 450 x10*3/uL    Neutrophils % 81.8 40.0 - 80.0 %    Immature Granulocytes %, Automated 0.3 0.0 - 0.9 %    Lymphocytes % 7.3 13.0 - 44.0 %    Monocytes % 9.9 2.0 - 10.0 %    Eosinophils % 0.6 0.0 - 6.0 %    Basophils % 0.1 0.0 - 2.0 %    Neutrophils Absolute 7.10 (H) 1.60 - 5.50 x10*3/uL    Immature Granulocytes Absolute, Automated 0.03 0.00 - 0.50 x10*3/uL    Lymphocytes Absolute 0.63 (L) 0.80 - 3.00 x10*3/uL    Monocytes Absolute 0.86 (H) 0.05 - 0.80 x10*3/uL    Eosinophils Absolute 0.05 0.00 - 0.40 x10*3/uL    Basophils Absolute 0.01 0.00 - 0.10 x10*3/uL   Renal Function Panel   Result Value Ref Range    Glucose 78 74 - 99 mg/dL    Sodium 143 136 - 145 mmol/L    Potassium 3.7 3.5 - 5.3 mmol/L    Chloride 110 (H) 98 - 107 mmol/L    Bicarbonate 25 21 - 32 mmol/L    Anion Gap 12 10 - 20 mmol/L    Urea Nitrogen 24 (H) 6 - 23 mg/dL    Creatinine 0.85 0.50 - 1.30 mg/dL    eGFR 83 >60 mL/min/1.73m*2    Calcium 8.2 (L) 8.6 - 10.6 mg/dL    Phosphorus 2.1 (L) 2.5 - 4.9 mg/dL    Albumin 2.6 (L) 3.4 - 5.0 g/dL   Magnesium   Result Value Ref Range    Magnesium 2.06 1.60 - 2.40 mg/dL   Creatine Kinase   Result Value Ref Range    Creatine Kinase 3,043 (H) 0 - 325 U/L     Lower extremity venous duplex bilateral    Result Date: 4/2/2024  Interpreted By:  Naima  Eladio Fonseca and Dervishi Mario STUDY: [facility] US LOWER EXTREMITY VENOUS DUPLEX BILATERAL;  4/1/2024 4:47 pm   INDICATION: Signs/Symptoms:Bilateral LE edema.   COMPARISON: CT abdomen pelvis: 04/01/2024.   ACCESSION NUMBER(S): [id number]   ORDERING CLINICIAN: DEO FLORES   TECHNIQUE: Vascular ultrasound of the bilateral lower extremities was performed. Real-time compression views as well as Gray scale, color Doppler and spectral Doppler waveform analysis was performed.   FINDINGS: Evaluation of the visualized portions of the bilateral common femoral vein, proximal, mid, and distal femoral vein, and popliteal vein were performed.  Evaluation of the visualized portions of the  posterior tibial veins were also performed. The right peroneal vein was also assessed. Limited assessment of the left peroneal vein due to significant soft tissue edema.   Limitations:  Soft tissue edema.   The evaluated veins demonstrate normal compressibility. There is intact venous flow demonstrating normal respiratory variability and normal augmentation of flow with calf compression. Therefore, there is no ultrasonographic evidence for deep vein thrombosis within the evaluated veins.   Respiratory variation and augmentation to calf pressure was noted.       1.  No sonographic evidence for deep vein thrombosis within the evaluated veins of the bilateral lower extremity. 2. Significant bilateral soft tissue edema.   I personally reviewed the images/study and I agree with the findings as stated by Resident J Carlos Covington MD. This study was interpreted at [place], [place].   MACRO: None   Signed by: Eladio Fonseca 4/2/2024 12:20 AM Dictation workstation:   [id number]    CT thoracic spine wo IV contrast    Result Date: 4/1/2024  STUDY: CT Chest, Abdomen, and Pelvis with IV Contrast, CT Thoracic Spine and Lumbar Spine without IV Contrast; 04/01/2024, 11:46 AM. INDICATION:  Trauma, unspecified. COMPARISON: Not available. ACCESSION NUMBER(S): [id number], [id number], [id number] ORDERING CLINICIAN: ARGENIS VU TECHNIQUE: CT of the chest, abdomen, and pelvis was performed.  Contiguous axial images were obtained at 3 mm slice thickness through the chest, abdomen, and pelvis.  Coronal and sagittal reconstructions at 3 mm slice thickness were performed.  Omnipaque 350 100 mL was administered intravenously. Please note that spinal images were generated from the original CT abdomen and pelvis imaging. FINDINGS: CHEST: MEDIASTINUM: The heart is normal in size without pericardial effusion.  Central vascular structures opacify normally.  LUNGS/PLEURA: There is no pleural effusion, pleural thickening, or pneumothorax. The airways are patent. Lungs demonstrate mild left basal atelectasis.  There are cystic changes noted within the right lung apex which could represent some element of possible fibrosis.. LYMPH NODES: Thoracic lymph nodes are not enlarged. ABDOMEN:  LIVER: No hepatomegaly.  Smooth surface contour.  Normal attenuation.  BILE DUCTS: No intrahepatic or extrahepatic biliary ductal dilatation.  GALLBLADDER: The gallbladder is visualized.  There is cholelithiasis. STOMACH: No abnormalities identified.  PANCREAS: No masses or ductal dilatation.  SPLEEN: No splenomegaly or focal splenic lesion.  ADRENAL GLANDS: No thickening or nodules.  KIDNEYS AND URETERS: Kidneys are normal in size and location.  No renal or ureteral calculi.  There is a 3.1 cm hypoattenuating lesion within the right kidney most consistent with a simple cyst.  PELVIS:  BLADDER: There is a 1.9 x 2.9 cm enhancing lesion along the posterior wall of the bladder (301-152, 303-80).  A right posterior lateral bladder diverticulum is visualized.  REPRODUCTIVE ORGANS: There are calcific changes of the prostate gland noted.  BOWEL: There is no evidence of bowel thickening or dilatation to suggest mechanical obstruction.   VESSELS: No abnormalities identified.  Abdominal aorta is normal in caliber. There are calcific atherosclerotic changes of the abdominal aorta noted.  No abdominal aortic aneurysm.  PERITONEUM/RETROPERITONEUM/LYMPH NODES: No free fluid.  No pneumoperitoneum. No lymphadenopathy.  ABDOMINAL WALL: No abnormalities identified. SOFT TISSUES: There are probable bilateral hydroceles noted.  BONES: No acute fracture or aggressive osseous lesion. THORACIC SPINE: The alignment is anatomic.  There is no fracture or traumatic subluxation. The vertebral body heights are well maintained.  Degenerative disc disease of the mid to lower thoracic spine with anterior marginal spurring noted.  nNo significant central canal stenosis is demonstrated.  The neural foramina are patent throughout.  The paravertebral soft tissues are within normal limits. LUMBAR SPINE: The alignment is anatomic.  There is no fracture or traumatic subluxation. The vertebral body heights are well maintained.  There is multilevel degenerative disc disease of the lumbar spine most prominently noted L4-L5..  No significant central canal stenosis is demonstrated.  The neural foramina are patent throughout.  The paravertebral soft tissues are within normal limits.    1.  No definite CT evidence of acute thoracic vascular injury. 2.  No definite CT evidence of acute abdominal or pelvic visceral/vascular injury. 3.  No pulmonary consolidation, pleural effusions or pneumothorax. 4.  No abdominal/pelvic fluid collections or pneumoperitoneum. 5.  There is a 2.9 cm enhancing lesion noted along the posterior wall the bladder which could be neoplastic in etiology.  I recommend further urologic evaluation. 6.  Probable bilateral hydroceles. 7.  No definite acute thoracic or lumbar vertebral body compression/fracture.  There is some multilevel degenerative disc disease of the thoracic and lumbar spine.. Signed by Ayan De La O MD    CT chest abdomen pelvis w IV  contrast    Result Date: 4/1/2024  STUDY: CT Chest, Abdomen, and Pelvis with IV Contrast, CT Thoracic Spine and Lumbar Spine without IV Contrast; 04/01/2024, 11:46 AM. INDICATION: Trauma, unspecified. COMPARISON: Not available. ACCESSION NUMBER(S): [id number], [id number], [id number] ORDERING CLINICIAN: ARGENIS VU TECHNIQUE: CT of the chest, abdomen, and pelvis was performed.  Contiguous axial images were obtained at 3 mm slice thickness through the chest, abdomen, and pelvis.  Coronal and sagittal reconstructions at 3 mm slice thickness were performed.  Omnipaque 350 100 mL was administered intravenously. Please note that spinal images were generated from the original CT abdomen and pelvis imaging. FINDINGS: CHEST: MEDIASTINUM: The heart is normal in size without pericardial effusion.  Central vascular structures opacify normally.  LUNGS/PLEURA: There is no pleural effusion, pleural thickening, or pneumothorax. The airways are patent. Lungs demonstrate mild left basal atelectasis.  There are cystic changes noted within the right lung apex which could represent some element of possible fibrosis.. LYMPH NODES: Thoracic lymph nodes are not enlarged. ABDOMEN:  LIVER: No hepatomegaly.  Smooth surface contour.  Normal attenuation.  BILE DUCTS: No intrahepatic or extrahepatic biliary ductal dilatation.  GALLBLADDER: The gallbladder is visualized.  There is cholelithiasis. STOMACH: No abnormalities identified.  PANCREAS: No masses or ductal dilatation.  SPLEEN: No splenomegaly or focal splenic lesion.  ADRENAL GLANDS: No thickening or nodules.  KIDNEYS AND URETERS: Kidneys are normal in size and location.  No renal or ureteral calculi.  There is a 3.1 cm hypoattenuating lesion within the right kidney most consistent with a simple cyst.  PELVIS:  BLADDER: There is a 1.9 x 2.9 cm enhancing lesion along the posterior wall of the bladder (301-152, 303-80).  A right posterior lateral bladder diverticulum is  visualized.  REPRODUCTIVE ORGANS: There are calcific changes of the prostate gland noted.  BOWEL: There is no evidence of bowel thickening or dilatation to suggest mechanical obstruction.  VESSELS: No abnormalities identified.  Abdominal aorta is normal in caliber. There are calcific atherosclerotic changes of the abdominal aorta noted.  No abdominal aortic aneurysm.  PERITONEUM/RETROPERITONEUM/LYMPH NODES: No free fluid.  No pneumoperitoneum. No lymphadenopathy.  ABDOMINAL WALL: No abnormalities identified. SOFT TISSUES: There are probable bilateral hydroceles noted.  BONES: No acute fracture or aggressive osseous lesion. THORACIC SPINE: The alignment is anatomic.  There is no fracture or traumatic subluxation. The vertebral body heights are well maintained.  Degenerative disc disease of the mid to lower thoracic spine with anterior marginal spurring noted.  nNo significant central canal stenosis is demonstrated.  The neural foramina are patent throughout.  The paravertebral soft tissues are within normal limits. LUMBAR SPINE: The alignment is anatomic.  There is no fracture or traumatic subluxation. The vertebral body heights are well maintained.  There is multilevel degenerative disc disease of the lumbar spine most prominently noted L4-L5..  No significant central canal stenosis is demonstrated.  The neural foramina are patent throughout.  The paravertebral soft tissues are within normal limits.    1.  No definite CT evidence of acute thoracic vascular injury. 2.  No definite CT evidence of acute abdominal or pelvic visceral/vascular injury. 3.  No pulmonary consolidation, pleural effusions or pneumothorax. 4.  No abdominal/pelvic fluid collections or pneumoperitoneum. 5.  There is a 2.9 cm enhancing lesion noted along the posterior wall the bladder which could be neoplastic in etiology.  I recommend further urologic evaluation. 6.  Probable bilateral hydroceles. 7.  No definite acute thoracic or lumbar  vertebral body compression/fracture.  There is some multilevel degenerative disc disease of the thoracic and lumbar spine.. Signed by Ayan De La O MD    CT lumbar spine wo IV contrast    Result Date: 4/1/2024  STUDY: CT Chest, Abdomen, and Pelvis with IV Contrast, CT Thoracic Spine and Lumbar Spine without IV Contrast; 04/01/2024, 11:46 AM. INDICATION: Trauma, unspecified. COMPARISON: Not available. ACCESSION NUMBER(S): [id number], [id number], [id number] ORDERING CLINICIAN: ARGENIS VU TECHNIQUE: CT of the chest, abdomen, and pelvis was performed.  Contiguous axial images were obtained at 3 mm slice thickness through the chest, abdomen, and pelvis.  Coronal and sagittal reconstructions at 3 mm slice thickness were performed.  Omnipaque 350 100 mL was administered intravenously. Please note that spinal images were generated from the original CT abdomen and pelvis imaging. FINDINGS: CHEST: MEDIASTINUM: The heart is normal in size without pericardial effusion.  Central vascular structures opacify normally.  LUNGS/PLEURA: There is no pleural effusion, pleural thickening, or pneumothorax. The airways are patent. Lungs demonstrate mild left basal atelectasis.  There are cystic changes noted within the right lung apex which could represent some element of possible fibrosis.. LYMPH NODES: Thoracic lymph nodes are not enlarged. ABDOMEN:  LIVER: No hepatomegaly.  Smooth surface contour.  Normal attenuation.  BILE DUCTS: No intrahepatic or extrahepatic biliary ductal dilatation.  GALLBLADDER: The gallbladder is visualized.  There is cholelithiasis. STOMACH: No abnormalities identified.  PANCREAS: No masses or ductal dilatation.  SPLEEN: No splenomegaly or focal splenic lesion.  ADRENAL GLANDS: No thickening or nodules.  KIDNEYS AND URETERS: Kidneys are normal in size and location.  No renal or ureteral calculi.  There is a 3.1 cm hypoattenuating lesion within the right kidney most consistent with a simple cyst.   PELVIS:  BLADDER: There is a 1.9 x 2.9 cm enhancing lesion along the posterior wall of the bladder (301-152, 303-80).  A right posterior lateral bladder diverticulum is visualized.  REPRODUCTIVE ORGANS: There are calcific changes of the prostate gland noted.  BOWEL: There is no evidence of bowel thickening or dilatation to suggest mechanical obstruction.  VESSELS: No abnormalities identified.  Abdominal aorta is normal in caliber. There are calcific atherosclerotic changes of the abdominal aorta noted.  No abdominal aortic aneurysm.  PERITONEUM/RETROPERITONEUM/LYMPH NODES: No free fluid.  No pneumoperitoneum. No lymphadenopathy.  ABDOMINAL WALL: No abnormalities identified. SOFT TISSUES: There are probable bilateral hydroceles noted.  BONES: No acute fracture or aggressive osseous lesion. THORACIC SPINE: The alignment is anatomic.  There is no fracture or traumatic subluxation. The vertebral body heights are well maintained.  Degenerative disc disease of the mid to lower thoracic spine with anterior marginal spurring noted.  nNo significant central canal stenosis is demonstrated.  The neural foramina are patent throughout.  The paravertebral soft tissues are within normal limits. LUMBAR SPINE: The alignment is anatomic.  There is no fracture or traumatic subluxation. The vertebral body heights are well maintained.  There is multilevel degenerative disc disease of the lumbar spine most prominently noted L4-L5..  No significant central canal stenosis is demonstrated.  The neural foramina are patent throughout.  The paravertebral soft tissues are within normal limits.    1.  No definite CT evidence of acute thoracic vascular injury. 2.  No definite CT evidence of acute abdominal or pelvic visceral/vascular injury. 3.  No pulmonary consolidation, pleural effusions or pneumothorax. 4.  No abdominal/pelvic fluid collections or pneumoperitoneum. 5.  There is a 2.9 cm enhancing lesion noted along the posterior wall the  bladder which could be neoplastic in etiology.  I recommend further urologic evaluation. 6.  Probable bilateral hydroceles. 7.  No definite acute thoracic or lumbar vertebral body compression/fracture.  There is some multilevel degenerative disc disease of the thoracic and lumbar spine.. Signed by Ayan De La O MD    CT head W O contrast trauma protocol    Result Date: 2024  Interpreted By:  Td Guillaume and Sullivan Shannon STUDY: CT HEAD W/O CONTRAST TRAUMA PROTOCOL; CT FACIAL BONES WO IV CONTRAST; CT CERVICAL SPINE WO IV CONTRAST;  2024 11:41 am   INDICATION: Signs/Symptoms:trauma.   Chemo Maldonado MRN [id number]  1934   COMPARISON: Correlation with CT temporal bone 2012.   ACCESSION NUMBER(S): [id number]; [id number]; [id number]   ORDERING CLINICIAN: ARGENIS VU   TECHNIQUE: Noncontrast axial CT scan of head was performed. Angled reformats in brain and bone windows were generated. The images were reviewed in bone, brain, blood and soft tissue windows.   Thin cut axial CT images through the facial bones were obtained and reconstructed in the coronal and sagittal plane. 3D reconstructions were performed on an independent workstation and provided for review.   Axial CT images of the cervical spine are obtained. Axial, coronal and sagittal reconstructions are provided for review.   FINDINGS:   Noncontrast CT head:   No acute infarct or intracranial hemorrhage. No intraventricular hemorrhage. Patchy and confluent subcortical and periventricular white matter hypodensities are noted, which are nonspecific but likely reflect sequelae of chronic small ischemic changes. No mass effect or midline shift. No extra-axial fluid collection. The ventricles, sulci and basal cisterns are diffusely prominent, likely reflecting age-related parenchymal volume loss..   No displaced calvarial fracture.     CT facial bones:   Orbits: The bony orbits are intact. Bilateral lens replacements are noted. The  orbital contents are otherwise unremarkable.   Facial Bones: There is no displaced facial bone fracture.   Mandible/Temporomandibular Joints: The patient is completely edentulous. Visualized portions of mandible and bilateral temporomandibular joints are intact.   Paranasal Sinuses/Mastoids: Minimal scattered mucosal thickening in the paranasal sinuses. The bilateral mastoid air cells are clear.   Soft tissues: Unremarkable.     CT cervical spine:   There is transitional vertebra in the cervicothoracic junction with truncated bilateral C7 cervical ribs. For the purposes of this examination, the transitional vertebra will be referred to as C7 with the assumption of 7 cervical vertebrae.   Alignment: The atlantooccipital and atlantoaxial intervals are preserved. The cervical spinal columns are preserved, including the spinolaminar and posterior spinous lines, without evidence of spondylolisthesis.   Vertebrae/Intervertebral Discs: The vertebral bodies and intervertebral discs demonstrate expected height. Mild multilevel degenerative changes of the cervical spine are noted, including osteophytosis and posterior disc osteophyte complexes.   No fracture of the cervical spine.   Sesamoid ossicles are noted in the nuchal ligament overlying C3-C4. The prevertebral and posterior paraspinous soft tissues are otherwise unremarkable.           1. No acute intracranial abnormality. No calvarial fracture. 2. No acute facial bone fracture. 3. No acute fracture subluxation of the cervical spine detected     I personally reviewed the images/study and I agree with the findings as stated by Radiology resident Dr. Garcia.   MACRO: None   Signed by: Td Guillaume 4/1/2024 12:41 PM Dictation workstation:   [id number]    CT cervical spine wo IV contrast    Result Date: 4/1/2024  Interpreted By:  Td Guillaume and Sullivan Olya STUDY: CT HEAD W/O CONTRAST TRAUMA PROTOCOL; CT FACIAL BONES WO IV CONTRAST; CT CERVICAL SPINE WO IV CONTRAST;   2024 11:41 am   INDICATION: Signs/Symptoms:trauma.   Chemo Maldonado MRN [id number]  1934   COMPARISON: Correlation with CT temporal bone 2012.   ACCESSION NUMBER(S): [id number]; [id number]; [id number]   ORDERING CLINICIAN: ARGENIS VU   TECHNIQUE: Noncontrast axial CT scan of head was performed. Angled reformats in brain and bone windows were generated. The images were reviewed in bone, brain, blood and soft tissue windows.   Thin cut axial CT images through the facial bones were obtained and reconstructed in the coronal and sagittal plane. 3D reconstructions were performed on an independent workstation and provided for review.   Axial CT images of the cervical spine are obtained. Axial, coronal and sagittal reconstructions are provided for review.   FINDINGS:   Noncontrast CT head:   No acute infarct or intracranial hemorrhage. No intraventricular hemorrhage. Patchy and confluent subcortical and periventricular white matter hypodensities are noted, which are nonspecific but likely reflect sequelae of chronic small ischemic changes. No mass effect or midline shift. No extra-axial fluid collection. The ventricles, sulci and basal cisterns are diffusely prominent, likely reflecting age-related parenchymal volume loss..   No displaced calvarial fracture.     CT facial bones:   Orbits: The bony orbits are intact. Bilateral lens replacements are noted. The orbital contents are otherwise unremarkable.   Facial Bones: There is no displaced facial bone fracture.   Mandible/Temporomandibular Joints: The patient is completely edentulous. Visualized portions of mandible and bilateral temporomandibular joints are intact.   Paranasal Sinuses/Mastoids: Minimal scattered mucosal thickening in the paranasal sinuses. The bilateral mastoid air cells are clear.   Soft tissues: Unremarkable.     CT cervical spine:   There is transitional vertebra in the cervicothoracic junction with truncated bilateral C7  cervical ribs. For the purposes of this examination, the transitional vertebra will be referred to as C7 with the assumption of 7 cervical vertebrae.   Alignment: The atlantooccipital and atlantoaxial intervals are preserved. The cervical spinal columns are preserved, including the spinolaminar and posterior spinous lines, without evidence of spondylolisthesis.   Vertebrae/Intervertebral Discs: The vertebral bodies and intervertebral discs demonstrate expected height. Mild multilevel degenerative changes of the cervical spine are noted, including osteophytosis and posterior disc osteophyte complexes.   No fracture of the cervical spine.   Sesamoid ossicles are noted in the nuchal ligament overlying C3-C4. The prevertebral and posterior paraspinous soft tissues are otherwise unremarkable.           1. No acute intracranial abnormality. No calvarial fracture. 2. No acute facial bone fracture. 3. No acute fracture subluxation of the cervical spine detected     I personally reviewed the images/study and I agree with the findings as stated by Radiology resident Dr. Garcia.   MACRO: None   Signed by: Td Guillaume 2024 12:41 PM Dictation workstation:   [id number]    CT maxillofacial bones wo IV contrast    Result Date: 2024  Interpreted By:  Td Guillaume and Sullivan Shannon STUDY: CT HEAD W/O CONTRAST TRAUMA PROTOCOL; CT FACIAL BONES WO IV CONTRAST; CT CERVICAL SPINE WO IV CONTRAST;  2024 11:41 am   INDICATION: Signs/Symptoms:trauma.   Chemo Maldonado MRN [id number]  1934   COMPARISON: Correlation with CT temporal bone 2012.   ACCESSION NUMBER(S): [id number]; [id number]; [id number]   ORDERING CLINICIAN: ARGENIS VU   TECHNIQUE: Noncontrast axial CT scan of head was performed. Angled reformats in brain and bone windows were generated. The images were reviewed in bone, brain, blood and soft tissue windows.   Thin cut axial CT images through the facial bones were obtained and  reconstructed in the coronal and sagittal plane. 3D reconstructions were performed on an independent workstation and provided for review.   Axial CT images of the cervical spine are obtained. Axial, coronal and sagittal reconstructions are provided for review.   FINDINGS:   Noncontrast CT head:   No acute infarct or intracranial hemorrhage. No intraventricular hemorrhage. Patchy and confluent subcortical and periventricular white matter hypodensities are noted, which are nonspecific but likely reflect sequelae of chronic small ischemic changes. No mass effect or midline shift. No extra-axial fluid collection. The ventricles, sulci and basal cisterns are diffusely prominent, likely reflecting age-related parenchymal volume loss..   No displaced calvarial fracture.     CT facial bones:   Orbits: The bony orbits are intact. Bilateral lens replacements are noted. The orbital contents are otherwise unremarkable.   Facial Bones: There is no displaced facial bone fracture.   Mandible/Temporomandibular Joints: The patient is completely edentulous. Visualized portions of mandible and bilateral temporomandibular joints are intact.   Paranasal Sinuses/Mastoids: Minimal scattered mucosal thickening in the paranasal sinuses. The bilateral mastoid air cells are clear.   Soft tissues: Unremarkable.     CT cervical spine:   There is transitional vertebra in the cervicothoracic junction with truncated bilateral C7 cervical ribs. For the purposes of this examination, the transitional vertebra will be referred to as C7 with the assumption of 7 cervical vertebrae.   Alignment: The atlantooccipital and atlantoaxial intervals are preserved. The cervical spinal columns are preserved, including the spinolaminar and posterior spinous lines, without evidence of spondylolisthesis.   Vertebrae/Intervertebral Discs: The vertebral bodies and intervertebral discs demonstrate expected height. Mild multilevel degenerative changes of the cervical  spine are noted, including osteophytosis and posterior disc osteophyte complexes.   No fracture of the cervical spine.   Sesamoid ossicles are noted in the nuchal ligament overlying C3-C4. The prevertebral and posterior paraspinous soft tissues are otherwise unremarkable.           1. No acute intracranial abnormality. No calvarial fracture. 2. No acute facial bone fracture. 3. No acute fracture subluxation of the cervical spine detected     I personally reviewed the images/study and I agree with the findings as stated by Radiology resident Dr. Garcia.   MACRO: None   Signed by: Td Guillaume 4/1/2024 12:41 PM Dictation workstation:   [id number]    XR chest 1 view    Result Date: 4/1/2024  STUDY: Chest Radiograph;  4/1/2024 11:52 AM INDICATION: Trauma. COMPARISON: None Available ACCESSION NUMBER(S): [id number] ORDERING CLINICIAN: ARGENIS VU TECHNIQUE:  Frontal chest was obtained at 11:51 hours. FINDINGS: CARDIOMEDIASTINAL SILHOUETTE: Cardiomediastinal silhouette is normal in size and configuration.  LUNGS: Lungs are clear.  Elevation of the left hemidiaphragm.  ABDOMEN: No remarkable upper abdominal findings.  BONES: No acute osseous changes.    No acute process. Signed by Alberto Hubbard MD    XR pelvis 1-2 views    Result Date: 4/1/2024  STUDY: Pelvis Radiographs; 4/1/2024 11:52 AM INDICATION: Trauma. COMPARISON: None Available. ACCESSION NUMBER(S): [id number] ORDERING CLINICIAN: ARGENIS VU TECHNIQUE:  One view(s) of the pelvis. FINDINGS:  The pelvic ring is intact.  There is no acute fracture.  Mild degenerative change of the hips.    No acute osseous abnormality. Signed by Alberto Hubbard MD        Assessment/Plan   Principal Problem:    Atrial fibrillation, unspecified type (CMS/HCC)    Chemo Maldonado is an 89 y.o. male with hx of HTN, HLD, and BPH [place] [facility] [place] following a fall going down his stairs. Patient had been laying on the floor since 10pm last night until he was brought in by  EMS. Labs concerning for elevated CK, elevated lactate, elevated AST and chronic thrombocytopenia. Patients presentation is consistent with rhabdomyolysis in the setting of his mechanical fall and prolonged immobilization. Patient also with new a-fib that is rate controlled.    4/03 Updates  - continue ceftriaxone for UTI   - pending Echo to rule out structural causes for a-fib  - will stop IV fluids given down trending CK  - Speech ok with pureed food and thin liquids 1:1  - Thiamine 500mg TID      # Rhabdomyolysis   :: Elevated CK level 8313-> down to 5877--> 3043  :: Elevated Lactate 3.3--> 4.3--> 1.6  :: elevated transaminases    :: myalgias, patient down for several hours  :: No evidence of AARON or hemolysis   Plan:   - Stop fluids    - Tylenol for myalgias   - Continue simvastatin in setting of elevated transaminases      # new onset a-fib, rate controlled   :: patient reports feeling fluttering in chest at night that is paroxysmal in nature   :: Rate controlled   :: [id number] Score of 3  :: TSH wnl  Plan:   - Pending echo today to rule out valvular disease or cardiac causes of new onset a-fib   - Replete electrolytes as needed      #Fall  :: likely mechanical in nature, given no LOC, no Hx of cardiac issues   Plan:  - consult PT/OT     # BPH  # Bladder lesion   # UTI  :: There is a 1.9 x 2.9 cm enhancing lesion along the posterior wall of  the bladder   :: No reported hematuria  :: UA positive for 3+ blood, >20 RBC; + leukocyte esterase and >50 WBC  Plan:   - follow up UCx   - Start ceftriaxone 1g for UTI  - Follows with urology outpatient for BPH, will schedule follow up for lesion   - continue home finasteride  - Will hold home doxazosin     #HTN  - Will hold home lisinopril and furosemide in the setting of treatment for rhabdo, current blood pressures with systolics in 120-130s    #Delirium   - patient disoriented to place, likely sundowning   - Started IV Thiamine in the setting of questionable  alcohol use   - delirium precautions, frequent reorientation, OOB and into chair     #Lymphedema   :: Patient stated had this for 20 years   :: slightly more edematous than baseline   :: BLE venous duplex negative for DVT  Plan:   - Compression stockings      #Thrombocytopenia   :: Platelets 85 on admission, with baseline between   :: Thought to be secondary to benign ethnic leukopenia, ITP, or early MDS   :: Has been seen in outpatient hematology clinic for stable chronic thrombocytopenia with plan to repeat CBC in 1 year with consideration for BMBx if continues to decrease  Plan:  - Will monitor and ensure outpatient hematology follow-up     F: NS 200cc/hr  E: replete as needed  N: regular diet     DVT ppx: lovenox  GI ppx: not indicated     Access: PIV     Code status: DNR/DNI   NOK: Herber (nephew) [phone]              Mayuri Nascimento MD

## 2024-04-03 NOTE — PROGRESS NOTES
Physical Therapy    Physical Therapy Evaluation    Patient Name: Chemo Maldonado  MRN: [id number]  Today's Date: 4/3/2024   Time Calculation  Start Time: 0945  Stop Time: 1006  Time Calculation (min): 21 min    Assessment/Plan   PT Assessment  PT Assessment Results: Decreased strength, Decreased range of motion, Decreased endurance, Impaired balance, Decreased mobility  Rehab Prognosis: Good  Evaluation/Treatment Tolerance: Patient limited by fatigue  Strengths: Attitude of self  Barriers to Participation:  (severe LE swelling)  End of Session Communication: Bedside nurse  Assessment Comment: Patient presents to PT for evaluation s/p unwitnessed fall, found by neighbor. Patient was max A x 2 for mobility at the bed level, exhibited reduced activity tolerance, and was limited by severe LE swelling. Patient is appropriate for continued skilled PT while in house to address previously mentioned impairments and work toward inc functional independence and endurance.     End of Session Patient Position: Bed, 3 rail up, Alarm off, not on at start of session  IP OR SWING BED PT PLAN  Inpatient or Swing Bed: Inpatient  PT Plan  Treatment/Interventions: Bed mobility, Transfer training, Gait training, Balance training, Endurance training, Therapeutic exercise, Therapeutic activity  PT Plan: Skilled PT  PT Frequency: 3 times per week  PT Discharge Recommendations: Moderate intensity level of continued care  PT Recommended Transfer Status: Assist x2  PT - OK to Discharge: Yes      Subjective   General Visit Information:  Reason for Referral: unwitnessed fall on stairs-down overnight-found by neighbor  Past Medical History Relevant to Rehab: BPH with LUTS, chronic leukopenia/thrombocytopenia  Co-Treatment: OT  Co-Treatment Reason: maximize pt safety and faciliate DC planning  Prior to Session Communication: Bedside nurse  Patient Position Received: Bed, 3 rail up  Family/Caregiver Present: No  General Comment: agreeable to PT, pleasant  and cooperative   Home Living:  Home Living  Type of Home: House ([place])  Lives With: Alone (but nephew comes to check in on patient)  Home Adaptive Equipment: Cane  Home Layout: One level, Laundry in basement  Home Access: Stairs to enter with rails  Entrance Stairs-Number of Steps: 4  Prior Level of Function:  Prior Function Per Pt/Caregiver Report  Level of [place]: Independent with ADLs and functional transfers, Independent with homemaking with ambulation  Ambulatory Assistance: Independent  Prior Function Comments: x2 falls past weeks  Precautions:     Vital Signs:  Vital Signs  Heart Rate: 103  Heart Rate Source: Monitor    Objective   Lines/Tubes/Drains:  External Urinary Catheter Male (Active)   Number of days: 1     Continuous Medications/Drips:     Oxygen:    Pain:  Pain Assessment  Pain Assessment:  (c/o pain in L groin, reduced when up in sitting, did not rate)  Cognition:  Cognition  Orientation Level: Oriented X4      Extremity/Trunk Assessments:  Strength:  Strength Comments: BLE with little to no active movement, severe swelling globally. RUE no shoulder elevation, LUE shoulder elevation WFL                General Assessments:  Strength  Strength Comments: BLE with little to no active movement, severe swelling globally. RUE no shoulder elevation, LUE shoulder elevation WFL      Activity Tolerance  Endurance: Tolerates less than 10 min exercise with changes in vital signs        Static Sitting Balance  Static Sitting-Balance Support: Right upper extremity supported, Feet supported  Static Sitting-Level of Assistance:  (pt leaning on RUE on HOB elevated with, when in midline posture without UE support max A x 1 required, sat ~ 8 mins)       Functional Assessments:  Bed Mobility  Bed Mobility: Yes  Bed Mobility 1  Bed Mobility 1: Supine to sitting, Sitting to supine  Level of Assistance 1: Maximum assistance, Moderate verbal cues, Moderate tactile cues (x2)  Bed Mobility Comments 1: HOB elevated,  use of draw sheet to complete to EOB, full assist with BLEs, pt able to use LUE for support in transfer  Transfers  Transfer: No  Ambulation/Gait Training  Ambulation/Gait Training Performed: No          Outcome Measures:  [facility] Basic Mobility  Turning from your back to your side while in a flat bed without using bedrails: Total  Moving from lying on your back to sitting on the side of a flat bed without using bedrails: Total  Moving to and from bed to chair (including a wheelchair): Total  Standing up from a chair using your arms (e.g. wheelchair or bedside chair): Total  To walk in hospital room: Total  Climbing 3-5 steps with railing: Total  Basic Mobility - Total Score: 6                               Encounter Problems       Encounter Problems (Active)       PT Problem       patient will perform supine <> sit with mod assist for increased independence with bed mobility upon DC  (Progressing)       Start:  04/03/24    Expected End:  04/17/24            patient will perform STS transfer with mod A for increased independence with transfers upon DC  (Progressing)       Start:  04/03/24    Expected End:  04/17/24            patient will ambulate 15 feet with use of RW and mod assist  in order to demonstrate ability to manage short household distances.  (Progressing)       Start:  04/03/24    Expected End:  04/17/24            patient will sit x 10 mins without UE support and CGA assist while performing UE task without LOB for functional carryover  (Progressing)       Start:  04/03/24    Expected End:  04/17/24                   Education Documentation  Precautions, taught by Renu Oliveira PT at 4/3/2024 11:22 AM.  Learner: Patient  Readiness: Acceptance  Method: Explanation, Demonstration  Response: Demonstrated Understanding, Verbalizes Understanding    Body Mechanics, taught by Renu Oliveira PT at 4/3/2024 11:22 AM.  Learner: Patient  Readiness: Acceptance  Method: Explanation,  Demonstration  Response: Demonstrated Understanding, Verbalizes Understanding    Mobility Training, taught by Renu Oliveira, PT at 4/3/2024 11:22 AM.  Learner: Patient  Readiness: Acceptance  Method: Explanation, Demonstration  Response: Demonstrated Understanding, Verbalizes Understanding    Education Comments  No comments found.            04/03/24 at 11:30 AM   Renu Oliveira, VINAYAK   Rehab Office: [phone]

## 2024-04-04 ENCOUNTER — APPOINTMENT (OUTPATIENT)
Dept: CARDIOLOGY | Facility: HOSPITAL | Age: 89
DRG: 558 | End: 2024-04-04
Payer: MEDICARE

## 2024-04-04 LAB
ALBUMIN SERPL BCP-MCNC: 2.5 G/DL (ref 3.4–5)
ANION GAP SERPL CALC-SCNC: 9 MMOL/L (ref 10–20)
ATRIAL RATE: 92 BPM
BACTERIA UR CULT: ABNORMAL
BASOPHILS # BLD AUTO: 0.02 X10*3/UL (ref 0–0.1)
BASOPHILS NFR BLD AUTO: 0.3 %
BUN SERPL-MCNC: 21 MG/DL (ref 6–23)
CALCIUM SERPL-MCNC: 8 MG/DL (ref 8.6–10.6)
CHLORIDE SERPL-SCNC: 110 MMOL/L (ref 98–107)
CK SERPL-CCNC: 1560 U/L (ref 0–325)
CO2 SERPL-SCNC: 27 MMOL/L (ref 21–32)
CREAT SERPL-MCNC: 0.91 MG/DL (ref 0.5–1.3)
EGFRCR SERPLBLD CKD-EPI 2021: 81 ML/MIN/1.73M*2
EOSINOPHIL # BLD AUTO: 0.18 X10*3/UL (ref 0–0.4)
EOSINOPHIL NFR BLD AUTO: 2.3 %
ERYTHROCYTE [DISTWIDTH] IN BLOOD BY AUTOMATED COUNT: 13.6 % (ref 11.5–14.5)
GLUCOSE BLD MANUAL STRIP-MCNC: 101 MG/DL (ref 74–99)
GLUCOSE SERPL-MCNC: 102 MG/DL (ref 74–99)
HCT VFR BLD AUTO: 37.4 % (ref 41–52)
HGB BLD-MCNC: 11.6 G/DL (ref 13.5–17.5)
IMM GRANULOCYTES # BLD AUTO: 0.09 X10*3/UL (ref 0–0.5)
IMM GRANULOCYTES NFR BLD AUTO: 1.2 % (ref 0–0.9)
LYMPHOCYTES # BLD AUTO: 0.79 X10*3/UL (ref 0.8–3)
LYMPHOCYTES NFR BLD AUTO: 10.3 %
MAGNESIUM SERPL-MCNC: 1.98 MG/DL (ref 1.6–2.4)
MCH RBC QN AUTO: 28.7 PG (ref 26–34)
MCHC RBC AUTO-ENTMCNC: 31 G/DL (ref 32–36)
MCV RBC AUTO: 93 FL (ref 80–100)
MONOCYTES # BLD AUTO: 0.9 X10*3/UL (ref 0.05–0.8)
MONOCYTES NFR BLD AUTO: 11.7 %
NEUTROPHILS # BLD AUTO: 5.69 X10*3/UL (ref 1.6–5.5)
NEUTROPHILS NFR BLD AUTO: 74.2 %
NRBC BLD-RTO: 0 /100 WBCS (ref 0–0)
P AXIS: 17 DEGREES
P OFFSET: 208 MS
P ONSET: 167 MS
PHOSPHATE SERPL-MCNC: 2 MG/DL (ref 2.5–4.9)
PLATELET # BLD AUTO: 89 X10*3/UL (ref 150–450)
POTASSIUM SERPL-SCNC: 3.8 MMOL/L (ref 3.5–5.3)
PR INTERVAL: 104 MS
Q ONSET: 219 MS
QRS COUNT: 15 BEATS
QRS DURATION: 86 MS
QT INTERVAL: 354 MS
QTC CALCULATION(BAZETT): 437 MS
QTC FREDERICIA: 408 MS
R AXIS: 70 DEGREES
RBC # BLD AUTO: 4.04 X10*6/UL (ref 4.5–5.9)
SODIUM SERPL-SCNC: 142 MMOL/L (ref 136–145)
T AXIS: 64 DEGREES
T OFFSET: 396 MS
VENTRICULAR RATE: 92 BPM
WBC # BLD AUTO: 7.7 X10*3/UL (ref 4.4–11.3)

## 2024-04-04 PROCEDURE — 36415 COLL VENOUS BLD VENIPUNCTURE: CPT

## 2024-04-04 PROCEDURE — 93010 ELECTROCARDIOGRAM REPORT: CPT | Performed by: INTERNAL MEDICINE

## 2024-04-04 PROCEDURE — 82550 ASSAY OF CK (CPK): CPT

## 2024-04-04 PROCEDURE — 80069 RENAL FUNCTION PANEL: CPT

## 2024-04-04 PROCEDURE — 85025 COMPLETE CBC W/AUTO DIFF WBC: CPT

## 2024-04-04 PROCEDURE — 83735 ASSAY OF MAGNESIUM: CPT

## 2024-04-04 PROCEDURE — 2500000004 HC RX 250 GENERAL PHARMACY W/ HCPCS (ALT 636 FOR OP/ED): Performed by: STUDENT IN AN ORGANIZED HEALTH CARE EDUCATION/TRAINING PROGRAM

## 2024-04-04 PROCEDURE — 93005 ELECTROCARDIOGRAM TRACING: CPT

## 2024-04-04 PROCEDURE — 2500000002 HC RX 250 W HCPCS SELF ADMINISTERED DRUGS (ALT 637 FOR MEDICARE OP, ALT 636 FOR OP/ED): Mod: MUE

## 2024-04-04 PROCEDURE — 2500000004 HC RX 250 GENERAL PHARMACY W/ HCPCS (ALT 636 FOR OP/ED)

## 2024-04-04 PROCEDURE — 2500000001 HC RX 250 WO HCPCS SELF ADMINISTERED DRUGS (ALT 637 FOR MEDICARE OP)

## 2024-04-04 PROCEDURE — 2500000001 HC RX 250 WO HCPCS SELF ADMINISTERED DRUGS (ALT 637 FOR MEDICARE OP): Performed by: STUDENT IN AN ORGANIZED HEALTH CARE EDUCATION/TRAINING PROGRAM

## 2024-04-04 PROCEDURE — 82947 ASSAY GLUCOSE BLOOD QUANT: CPT

## 2024-04-04 PROCEDURE — 99232 SBSQ HOSP IP/OBS MODERATE 35: CPT

## 2024-04-04 PROCEDURE — 1200000002 HC GENERAL ROOM WITH TELEMETRY DAILY

## 2024-04-04 PROCEDURE — 2500000005 HC RX 250 GENERAL PHARMACY W/O HCPCS

## 2024-04-04 RX ORDER — CEPHALEXIN 500 MG/1
500 CAPSULE ORAL EVERY 6 HOURS SCHEDULED
Status: DISCONTINUED | OUTPATIENT
Start: 2024-04-04 | End: 2024-04-06 | Stop reason: HOSPADM

## 2024-04-04 RX ADMIN — ACETAMINOPHEN 975 MG: 325 TABLET ORAL at 20:53

## 2024-04-04 RX ADMIN — POTASSIUM PHOSPHATE, MONOBASIC 500 MG: 500 TABLET, SOLUBLE ORAL at 20:53

## 2024-04-04 RX ADMIN — SIMVASTATIN 20 MG: 20 TABLET, FILM COATED ORAL at 20:53

## 2024-04-04 RX ADMIN — THIAMINE HYDROCHLORIDE 500 MG: 100 INJECTION, SOLUTION INTRAMUSCULAR; INTRAVENOUS at 09:00

## 2024-04-04 RX ADMIN — METOPROLOL TARTRATE 12.5 MG: 25 TABLET, FILM COATED ORAL at 05:59

## 2024-04-04 RX ADMIN — THIAMINE HYDROCHLORIDE 500 MG: 100 INJECTION, SOLUTION INTRAMUSCULAR; INTRAVENOUS at 14:53

## 2024-04-04 RX ADMIN — LIDOCAINE 1 PATCH: 4 PATCH TOPICAL at 09:15

## 2024-04-04 RX ADMIN — POTASSIUM PHOSPHATE, MONOBASIC 500 MG: 500 TABLET, SOLUBLE ORAL at 09:15

## 2024-04-04 RX ADMIN — POTASSIUM PHOSPHATE, MONOBASIC 500 MG: 500 TABLET, SOLUBLE ORAL at 13:50

## 2024-04-04 RX ADMIN — ENOXAPARIN SODIUM 40 MG: 100 INJECTION SUBCUTANEOUS at 20:53

## 2024-04-04 RX ADMIN — CEFTRIAXONE SODIUM 1 G: 1 INJECTION, SOLUTION INTRAVENOUS at 09:15

## 2024-04-04 RX ADMIN — CEPHALEXIN 500 MG: 500 CAPSULE ORAL at 17:44

## 2024-04-04 RX ADMIN — FOLIC ACID 1 MG: 1 TABLET ORAL at 09:15

## 2024-04-04 RX ADMIN — POTASSIUM PHOSPHATE, MONOBASIC 500 MG: 500 TABLET, SOLUBLE ORAL at 17:32

## 2024-04-04 RX ADMIN — THIAMINE HYDROCHLORIDE 500 MG: 100 INJECTION, SOLUTION INTRAMUSCULAR; INTRAVENOUS at 20:53

## 2024-04-04 RX ADMIN — METOPROLOL TARTRATE 12.5 MG: 25 TABLET, FILM COATED ORAL at 17:32

## 2024-04-04 RX ADMIN — FINASTERIDE 5 MG: 5 TABLET, FILM COATED ORAL at 09:15

## 2024-04-04 ASSESSMENT — COGNITIVE AND FUNCTIONAL STATUS - GENERAL
STANDING UP FROM CHAIR USING ARMS: TOTAL
MOBILITY SCORE: 6
CLIMB 3 TO 5 STEPS WITH RAILING: TOTAL
HELP NEEDED FOR BATHING: TOTAL
MOVING FROM LYING ON BACK TO SITTING ON SIDE OF FLAT BED WITH BEDRAILS: TOTAL
WALKING IN HOSPITAL ROOM: TOTAL
CLIMB 3 TO 5 STEPS WITH RAILING: TOTAL
EATING MEALS: A LITTLE
DRESSING REGULAR UPPER BODY CLOTHING: TOTAL
EATING MEALS: A LITTLE
DAILY ACTIVITIY SCORE: 8
MOVING TO AND FROM BED TO CHAIR: TOTAL
DRESSING REGULAR UPPER BODY CLOTHING: TOTAL
PERSONAL GROOMING: TOTAL
PERSONAL GROOMING: A LITTLE
TURNING FROM BACK TO SIDE WHILE IN FLAT BAD: TOTAL
DRESSING REGULAR LOWER BODY CLOTHING: TOTAL
TURNING FROM BACK TO SIDE WHILE IN FLAT BAD: TOTAL
DRESSING REGULAR LOWER BODY CLOTHING: TOTAL
HELP NEEDED FOR BATHING: TOTAL
MOBILITY SCORE: 6
DAILY ACTIVITIY SCORE: 10
TOILETING: TOTAL
TOILETING: TOTAL
MOVING FROM LYING ON BACK TO SITTING ON SIDE OF FLAT BED WITH BEDRAILS: TOTAL
WALKING IN HOSPITAL ROOM: TOTAL
MOVING TO AND FROM BED TO CHAIR: TOTAL
STANDING UP FROM CHAIR USING ARMS: TOTAL

## 2024-04-04 ASSESSMENT — PAIN DESCRIPTION - LOCATION: LOCATION: LEG

## 2024-04-04 ASSESSMENT — PAIN DESCRIPTION - ORIENTATION: ORIENTATION: RIGHT

## 2024-04-04 ASSESSMENT — PAIN SCALES - GENERAL
PAINLEVEL_OUTOF10: 0 - NO PAIN
PAINLEVEL_OUTOF10: 5 - MODERATE PAIN
PAINLEVEL_OUTOF10: 0 - NO PAIN

## 2024-04-04 ASSESSMENT — PAIN - FUNCTIONAL ASSESSMENT
PAIN_FUNCTIONAL_ASSESSMENT: 0-10

## 2024-04-04 NOTE — CARE PLAN
The patient's goals for the shift include        Problem: Pain  Goal: My pain/discomfort is manageable  Outcome: Progressing     Problem: Safety  Goal: Patient will be injury free during hospitalization  Outcome: Progressing  Goal: I will remain free of falls  Outcome: Progressing     Problem: Daily Care  Goal: Daily care needs are met  Outcome: Progressing     Problem: Psychosocial Needs  Goal: Demonstrates ability to cope with hospitalization/illness  Outcome: Progressing  Goal: Collaborate with me, my family, and caregiver to identify my specific goals  Outcome: Progressing     Problem: Discharge Barriers  Goal: My discharge needs are met  Outcome: Progressing          Problem: Skin  Goal: Prevent/manage excess moisture  Outcome: Progressing  Flowsheets (Taken 4/4/2024 1121)  Prevent/manage excess moisture: Moisturize dry skin  Goal: Prevent/minimize sheer/friction injuries  Outcome: Progressing  Flowsheets (Taken 4/4/2024 1121)  Prevent/minimize sheer/friction injuries: Turn/reposition every 2 hours/use positioning/transfer devices  Goal: Promote/optimize nutrition  Outcome: Progressing  Flowsheets (Taken 4/4/2024 1121)  Promote/optimize nutrition: Monitor/record intake including meals  Goal: Promote skin healing  Outcome: Progressing  Flowsheets (Taken 4/4/2024 1121)  Promote skin healing: Turn/reposition every 2 hours/use positioning/transfer devices

## 2024-04-04 NOTE — PROGRESS NOTES
Chemo Maldonado is a 89 y.o. male on day 3 of admission presenting with Atrial fibrillation, unspecified type (CMS/HCC).    Transitional Care Coordination Progress Note:  Patient discussed during interdisciplinary rounds.   Team members present: MD and TCC  Plan per Medical/Surgical team: Waiting on TTE.  Payor: [employer] and [employer] related non medicare  Discharge disposition: SNF-[facility], waiting on acceptance.  Potential Barriers: None  ADOD: 04/07/24    SHANTE Craven (Nephew) [phone]    RADHA GONZALEZ

## 2024-04-04 NOTE — CARE PLAN
The patient's goals for the shift include      The clinical goals for the shift include Will have pain controlled during shift      Problem: Pain  Goal: My pain/discomfort is manageable  Outcome: Progressing     Problem: Safety  Goal: Patient will be injury free during hospitalization  Outcome: Progressing     Problem: Daily Care  Goal: Daily care needs are met  Outcome: Progressing

## 2024-04-04 NOTE — PROGRESS NOTES
"Chemo Maldonado is a 89 y.o. male on day 3 of admission presenting with Atrial fibrillation, unspecified type (CMS/HCC).    Subjective   Patient seen this am, alert and oriented. Looks better than days prior. Denies pain, soreness, muscles aches, CP, SOB, or abdominal pain.     Objective     Physical Exam  Physical Exam  Constitutional:       Appearance: Normal appearance.   HENT:      Head: Normocephalic and atraumatic.   Cardiovascular:      Rate and Rhythm: Normal rate. Regular rhythm today.     Heart sounds: Normal heart sounds.   Pulmonary:      Effort: Pulmonary effort is normal.      Breath sounds: Normal breath sounds.   Abdominal:      General: Abdomen is flat. Bowel sounds are normal.      Palpations: Abdomen is soft.   Musculoskeletal:         General: Swelling present.      Cervical back: Normal range of motion. No tenderness.      Comments: 2+ BLE that patient reports has been present for 20+ years  Bruising on left shoulder    Skin:     General: Skin is warm and dry.   Neurological:      General: No focal deficit present.      Mental Status: He is alert and oriented to person, place, and time.   Psychiatric:         Mood and Affect: Mood normal.         Behavior: Behavior normal.         Thought Content: Thought content normal.     Last Recorded Vitals  Blood pressure 114/69, pulse 90, temperature 36.6 °C (97.9 °F), resp. rate 18, height 1.854 m (6' 1\"), weight 129 kg (285 lb), SpO2 98 %.  Intake/Output last 3 Shifts:  I/O last 3 completed shifts:  In: 1440 (11.1 mL/kg) [P.O.:640; I.V.:800 (6.2 mL/kg)]  Out: 1100 (8.5 mL/kg) [Urine:1100 (0.2 mL/kg/hr)]  Weight: 129.3 kg     Relevant Results              Results for orders placed or performed during the hospital encounter of 04/01/24 (from the past 24 hour(s))   CBC and Auto Differential   Result Value Ref Range    WBC 8.7 4.4 - 11.3 x10*3/uL    nRBC 0.0 0.0 - 0.0 /100 WBCs    RBC 3.88 (L) 4.50 - 5.90 x10*6/uL    Hemoglobin 11.7 (L) 13.5 - 17.5 g/dL    " Hematocrit 34.2 (L) 41.0 - 52.0 %    MCV 88 80 - 100 fL    MCH 30.2 26.0 - 34.0 pg    MCHC 34.2 32.0 - 36.0 g/dL    RDW 13.5 11.5 - 14.5 %    Platelets 89 (L) 150 - 450 x10*3/uL    Neutrophils % 81.8 40.0 - 80.0 %    Immature Granulocytes %, Automated 0.3 0.0 - 0.9 %    Lymphocytes % 7.3 13.0 - 44.0 %    Monocytes % 9.9 2.0 - 10.0 %    Eosinophils % 0.6 0.0 - 6.0 %    Basophils % 0.1 0.0 - 2.0 %    Neutrophils Absolute 7.10 (H) 1.60 - 5.50 x10*3/uL    Immature Granulocytes Absolute, Automated 0.03 0.00 - 0.50 x10*3/uL    Lymphocytes Absolute 0.63 (L) 0.80 - 3.00 x10*3/uL    Monocytes Absolute 0.86 (H) 0.05 - 0.80 x10*3/uL    Eosinophils Absolute 0.05 0.00 - 0.40 x10*3/uL    Basophils Absolute 0.01 0.00 - 0.10 x10*3/uL   Renal Function Panel   Result Value Ref Range    Glucose 78 74 - 99 mg/dL    Sodium 143 136 - 145 mmol/L    Potassium 3.7 3.5 - 5.3 mmol/L    Chloride 110 (H) 98 - 107 mmol/L    Bicarbonate 25 21 - 32 mmol/L    Anion Gap 12 10 - 20 mmol/L    Urea Nitrogen 24 (H) 6 - 23 mg/dL    Creatinine 0.85 0.50 - 1.30 mg/dL    eGFR 83 >60 mL/min/1.73m*2    Calcium 8.2 (L) 8.6 - 10.6 mg/dL    Phosphorus 2.1 (L) 2.5 - 4.9 mg/dL    Albumin 2.6 (L) 3.4 - 5.0 g/dL   Magnesium   Result Value Ref Range    Magnesium 2.06 1.60 - 2.40 mg/dL   Creatine Kinase   Result Value Ref Range    Creatine Kinase 3,043 (H) 0 - 325 U/L     Scheduled medications  cefTRIAXone, 1 g, intravenous, q24h  enoxaparin, 40 mg, subcutaneous, q24h  finasteride, 5 mg, oral, Daily  fluticasone, 2 spray, Each Nostril, Daily  folic acid, 1 mg, oral, Daily  lidocaine, 1 patch, transdermal, Daily  metoprolol tartrate, 12.5 mg, oral, q12h  potassium phosphate (monobasic), 500 mg, oral, 4x daily  simvastatin, 20 mg, oral, Nightly  thiamine, 500 mg, intravenous, TID      Continuous medications     PRN medications  PRN medications: acetaminophen  Results for orders placed or performed during the hospital encounter of 04/01/24 (from the past 24 hour(s))    CBC and Auto Differential   Result Value Ref Range    WBC 8.7 4.4 - 11.3 x10*3/uL    nRBC 0.0 0.0 - 0.0 /100 WBCs    RBC 3.88 (L) 4.50 - 5.90 x10*6/uL    Hemoglobin 11.7 (L) 13.5 - 17.5 g/dL    Hematocrit 34.2 (L) 41.0 - 52.0 %    MCV 88 80 - 100 fL    MCH 30.2 26.0 - 34.0 pg    MCHC 34.2 32.0 - 36.0 g/dL    RDW 13.5 11.5 - 14.5 %    Platelets 89 (L) 150 - 450 x10*3/uL    Neutrophils % 81.8 40.0 - 80.0 %    Immature Granulocytes %, Automated 0.3 0.0 - 0.9 %    Lymphocytes % 7.3 13.0 - 44.0 %    Monocytes % 9.9 2.0 - 10.0 %    Eosinophils % 0.6 0.0 - 6.0 %    Basophils % 0.1 0.0 - 2.0 %    Neutrophils Absolute 7.10 (H) 1.60 - 5.50 x10*3/uL    Immature Granulocytes Absolute, Automated 0.03 0.00 - 0.50 x10*3/uL    Lymphocytes Absolute 0.63 (L) 0.80 - 3.00 x10*3/uL    Monocytes Absolute 0.86 (H) 0.05 - 0.80 x10*3/uL    Eosinophils Absolute 0.05 0.00 - 0.40 x10*3/uL    Basophils Absolute 0.01 0.00 - 0.10 x10*3/uL   Renal Function Panel   Result Value Ref Range    Glucose 78 74 - 99 mg/dL    Sodium 143 136 - 145 mmol/L    Potassium 3.7 3.5 - 5.3 mmol/L    Chloride 110 (H) 98 - 107 mmol/L    Bicarbonate 25 21 - 32 mmol/L    Anion Gap 12 10 - 20 mmol/L    Urea Nitrogen 24 (H) 6 - 23 mg/dL    Creatinine 0.85 0.50 - 1.30 mg/dL    eGFR 83 >60 mL/min/1.73m*2    Calcium 8.2 (L) 8.6 - 10.6 mg/dL    Phosphorus 2.1 (L) 2.5 - 4.9 mg/dL    Albumin 2.6 (L) 3.4 - 5.0 g/dL   Magnesium   Result Value Ref Range    Magnesium 2.06 1.60 - 2.40 mg/dL   Creatine Kinase   Result Value Ref Range    Creatine Kinase 3,043 (H) 0 - 325 U/L     Lower extremity venous duplex bilateral    Result Date: 4/2/2024  Interpreted By:  Eladio Hernandez and Dervishi Mario STUDY: VASC US LOWER EXTREMITY VENOUS DUPLEX BILATERAL;  4/1/2024 4:47 pm   INDICATION: Signs/Symptoms:Bilateral LE edema.   COMPARISON: CT abdomen pelvis: 04/01/2024.   ACCESSION NUMBER(S): [id number]   ORDERING CLINICIAN: DEO FLORES   TECHNIQUE: Vascular ultrasound  of the bilateral lower extremities was performed. Real-time compression views as well as Gray scale, color Doppler and spectral Doppler waveform analysis was performed.   FINDINGS: Evaluation of the visualized portions of the bilateral common femoral vein, proximal, mid, and distal femoral vein, and popliteal vein were performed.  Evaluation of the visualized portions of the  posterior tibial veins were also performed. The right peroneal vein was also assessed. Limited assessment of the left peroneal vein due to significant soft tissue edema.   Limitations:  Soft tissue edema.   The evaluated veins demonstrate normal compressibility. There is intact venous flow demonstrating normal respiratory variability and normal augmentation of flow with calf compression. Therefore, there is no ultrasonographic evidence for deep vein thrombosis within the evaluated veins.   Respiratory variation and augmentation to calf pressure was noted.       1.  No sonographic evidence for deep vein thrombosis within the evaluated veins of the bilateral lower extremity. 2. Significant bilateral soft tissue edema.   I personally reviewed the images/study and I agree with the findings as stated by Resident J Carlos Covington MD. This study was interpreted at [place], [place].   MACRO: None   Signed by: Eladio Fonseca 4/2/2024 12:20 AM Dictation workstation:   [id number]    CT thoracic spine wo IV contrast    Result Date: 4/1/2024  STUDY: CT Chest, Abdomen, and Pelvis with IV Contrast, CT Thoracic Spine and Lumbar Spine without IV Contrast; 04/01/2024, 11:46 AM. INDICATION: Trauma, unspecified. COMPARISON: Not available. ACCESSION NUMBER(S): [id number], [id number], [id number] ORDERING CLINICIAN: ARGENIS VU TECHNIQUE: CT of the chest, abdomen, and pelvis was performed.  Contiguous axial images were obtained at 3 mm slice thickness through the chest, abdomen, and pelvis.   Coronal and sagittal reconstructions at 3 mm slice thickness were performed.  Omnipaque 350 100 mL was administered intravenously. Please note that spinal images were generated from the original CT abdomen and pelvis imaging. FINDINGS: CHEST: MEDIASTINUM: The heart is normal in size without pericardial effusion.  Central vascular structures opacify normally.  LUNGS/PLEURA: There is no pleural effusion, pleural thickening, or pneumothorax. The airways are patent. Lungs demonstrate mild left basal atelectasis.  There are cystic changes noted within the right lung apex which could represent some element of possible fibrosis.. LYMPH NODES: Thoracic lymph nodes are not enlarged. ABDOMEN:  LIVER: No hepatomegaly.  Smooth surface contour.  Normal attenuation.  BILE DUCTS: No intrahepatic or extrahepatic biliary ductal dilatation.  GALLBLADDER: The gallbladder is visualized.  There is cholelithiasis. STOMACH: No abnormalities identified.  PANCREAS: No masses or ductal dilatation.  SPLEEN: No splenomegaly or focal splenic lesion.  ADRENAL GLANDS: No thickening or nodules.  KIDNEYS AND URETERS: Kidneys are normal in size and location.  No renal or ureteral calculi.  There is a 3.1 cm hypoattenuating lesion within the right kidney most consistent with a simple cyst.  PELVIS:  BLADDER: There is a 1.9 x 2.9 cm enhancing lesion along the posterior wall of the bladder (301-152, 303-80).  A right posterior lateral bladder diverticulum is visualized.  REPRODUCTIVE ORGANS: There are calcific changes of the prostate gland noted.  BOWEL: There is no evidence of bowel thickening or dilatation to suggest mechanical obstruction.  VESSELS: No abnormalities identified.  Abdominal aorta is normal in caliber. There are calcific atherosclerotic changes of the abdominal aorta noted.  No abdominal aortic aneurysm.  PERITONEUM/RETROPERITONEUM/LYMPH NODES: No free fluid.  No pneumoperitoneum. No lymphadenopathy.  ABDOMINAL WALL: No abnormalities  identified. SOFT TISSUES: There are probable bilateral hydroceles noted.  BONES: No acute fracture or aggressive osseous lesion. THORACIC SPINE: The alignment is anatomic.  There is no fracture or traumatic subluxation. The vertebral body heights are well maintained.  Degenerative disc disease of the mid to lower thoracic spine with anterior marginal spurring noted.  nNo significant central canal stenosis is demonstrated.  The neural foramina are patent throughout.  The paravertebral soft tissues are within normal limits. LUMBAR SPINE: The alignment is anatomic.  There is no fracture or traumatic subluxation. The vertebral body heights are well maintained.  There is multilevel degenerative disc disease of the lumbar spine most prominently noted L4-L5..  No significant central canal stenosis is demonstrated.  The neural foramina are patent throughout.  The paravertebral soft tissues are within normal limits.    1.  No definite CT evidence of acute thoracic vascular injury. 2.  No definite CT evidence of acute abdominal or pelvic visceral/vascular injury. 3.  No pulmonary consolidation, pleural effusions or pneumothorax. 4.  No abdominal/pelvic fluid collections or pneumoperitoneum. 5.  There is a 2.9 cm enhancing lesion noted along the posterior wall the bladder which could be neoplastic in etiology.  I recommend further urologic evaluation. 6.  Probable bilateral hydroceles. 7.  No definite acute thoracic or lumbar vertebral body compression/fracture.  There is some multilevel degenerative disc disease of the thoracic and lumbar spine.. Signed by Ayan De La O MD    CT chest abdomen pelvis w IV contrast    Result Date: 4/1/2024  STUDY: CT Chest, Abdomen, and Pelvis with IV Contrast, CT Thoracic Spine and Lumbar Spine without IV Contrast; 04/01/2024, 11:46 AM. INDICATION: Trauma, unspecified. COMPARISON: Not available. ACCESSION NUMBER(S): [id number], [id number], [id number] ORDERING CLINICIAN: ARGENIS VU  TECHNIQUE: CT of the chest, abdomen, and pelvis was performed.  Contiguous axial images were obtained at 3 mm slice thickness through the chest, abdomen, and pelvis.  Coronal and sagittal reconstructions at 3 mm slice thickness were performed.  Omnipaque 350 100 mL was administered intravenously. Please note that spinal images were generated from the original CT abdomen and pelvis imaging. FINDINGS: CHEST: MEDIASTINUM: The heart is normal in size without pericardial effusion.  Central vascular structures opacify normally.  LUNGS/PLEURA: There is no pleural effusion, pleural thickening, or pneumothorax. The airways are patent. Lungs demonstrate mild left basal atelectasis.  There are cystic changes noted within the right lung apex which could represent some element of possible fibrosis.. LYMPH NODES: Thoracic lymph nodes are not enlarged. ABDOMEN:  LIVER: No hepatomegaly.  Smooth surface contour.  Normal attenuation.  BILE DUCTS: No intrahepatic or extrahepatic biliary ductal dilatation.  GALLBLADDER: The gallbladder is visualized.  There is cholelithiasis. STOMACH: No abnormalities identified.  PANCREAS: No masses or ductal dilatation.  SPLEEN: No splenomegaly or focal splenic lesion.  ADRENAL GLANDS: No thickening or nodules.  KIDNEYS AND URETERS: Kidneys are normal in size and location.  No renal or ureteral calculi.  There is a 3.1 cm hypoattenuating lesion within the right kidney most consistent with a simple cyst.  PELVIS:  BLADDER: There is a 1.9 x 2.9 cm enhancing lesion along the posterior wall of the bladder (301-152, 303-80).  A right posterior lateral bladder diverticulum is visualized.  REPRODUCTIVE ORGANS: There are calcific changes of the prostate gland noted.  BOWEL: There is no evidence of bowel thickening or dilatation to suggest mechanical obstruction.  VESSELS: No abnormalities identified.  Abdominal aorta is normal in caliber. There are calcific atherosclerotic changes of the abdominal aorta  noted.  No abdominal aortic aneurysm.  PERITONEUM/RETROPERITONEUM/LYMPH NODES: No free fluid.  No pneumoperitoneum. No lymphadenopathy.  ABDOMINAL WALL: No abnormalities identified. SOFT TISSUES: There are probable bilateral hydroceles noted.  BONES: No acute fracture or aggressive osseous lesion. THORACIC SPINE: The alignment is anatomic.  There is no fracture or traumatic subluxation. The vertebral body heights are well maintained.  Degenerative disc disease of the mid to lower thoracic spine with anterior marginal spurring noted.  nNo significant central canal stenosis is demonstrated.  The neural foramina are patent throughout.  The paravertebral soft tissues are within normal limits. LUMBAR SPINE: The alignment is anatomic.  There is no fracture or traumatic subluxation. The vertebral body heights are well maintained.  There is multilevel degenerative disc disease of the lumbar spine most prominently noted L4-L5..  No significant central canal stenosis is demonstrated.  The neural foramina are patent throughout.  The paravertebral soft tissues are within normal limits.    1.  No definite CT evidence of acute thoracic vascular injury. 2.  No definite CT evidence of acute abdominal or pelvic visceral/vascular injury. 3.  No pulmonary consolidation, pleural effusions or pneumothorax. 4.  No abdominal/pelvic fluid collections or pneumoperitoneum. 5.  There is a 2.9 cm enhancing lesion noted along the posterior wall the bladder which could be neoplastic in etiology.  I recommend further urologic evaluation. 6.  Probable bilateral hydroceles. 7.  No definite acute thoracic or lumbar vertebral body compression/fracture.  There is some multilevel degenerative disc disease of the thoracic and lumbar spine.. Signed by Ayan De La O MD    CT lumbar spine wo IV contrast    Result Date: 4/1/2024  STUDY: CT Chest, Abdomen, and Pelvis with IV Contrast, CT Thoracic Spine and Lumbar Spine without IV Contrast; 04/01/2024, 11:46  AM. INDICATION: Trauma, unspecified. COMPARISON: Not available. ACCESSION NUMBER(S): [id number], [id number], [id number] ORDERING CLINICIAN: ARGENIS VU TECHNIQUE: CT of the chest, abdomen, and pelvis was performed.  Contiguous axial images were obtained at 3 mm slice thickness through the chest, abdomen, and pelvis.  Coronal and sagittal reconstructions at 3 mm slice thickness were performed.  Omnipaque 350 100 mL was administered intravenously. Please note that spinal images were generated from the original CT abdomen and pelvis imaging. FINDINGS: CHEST: MEDIASTINUM: The heart is normal in size without pericardial effusion.  Central vascular structures opacify normally.  LUNGS/PLEURA: There is no pleural effusion, pleural thickening, or pneumothorax. The airways are patent. Lungs demonstrate mild left basal atelectasis.  There are cystic changes noted within the right lung apex which could represent some element of possible fibrosis.. LYMPH NODES: Thoracic lymph nodes are not enlarged. ABDOMEN:  LIVER: No hepatomegaly.  Smooth surface contour.  Normal attenuation.  BILE DUCTS: No intrahepatic or extrahepatic biliary ductal dilatation.  GALLBLADDER: The gallbladder is visualized.  There is cholelithiasis. STOMACH: No abnormalities identified.  PANCREAS: No masses or ductal dilatation.  SPLEEN: No splenomegaly or focal splenic lesion.  ADRENAL GLANDS: No thickening or nodules.  KIDNEYS AND URETERS: Kidneys are normal in size and location.  No renal or ureteral calculi.  There is a 3.1 cm hypoattenuating lesion within the right kidney most consistent with a simple cyst.  PELVIS:  BLADDER: There is a 1.9 x 2.9 cm enhancing lesion along the posterior wall of the bladder (301-152, 303-80).  A right posterior lateral bladder diverticulum is visualized.  REPRODUCTIVE ORGANS: There are calcific changes of the prostate gland noted.  BOWEL: There is no evidence of bowel thickening or dilatation to suggest  mechanical obstruction.  VESSELS: No abnormalities identified.  Abdominal aorta is normal in caliber. There are calcific atherosclerotic changes of the abdominal aorta noted.  No abdominal aortic aneurysm.  PERITONEUM/RETROPERITONEUM/LYMPH NODES: No free fluid.  No pneumoperitoneum. No lymphadenopathy.  ABDOMINAL WALL: No abnormalities identified. SOFT TISSUES: There are probable bilateral hydroceles noted.  BONES: No acute fracture or aggressive osseous lesion. THORACIC SPINE: The alignment is anatomic.  There is no fracture or traumatic subluxation. The vertebral body heights are well maintained.  Degenerative disc disease of the mid to lower thoracic spine with anterior marginal spurring noted.  nNo significant central canal stenosis is demonstrated.  The neural foramina are patent throughout.  The paravertebral soft tissues are within normal limits. LUMBAR SPINE: The alignment is anatomic.  There is no fracture or traumatic subluxation. The vertebral body heights are well maintained.  There is multilevel degenerative disc disease of the lumbar spine most prominently noted L4-L5..  No significant central canal stenosis is demonstrated.  The neural foramina are patent throughout.  The paravertebral soft tissues are within normal limits.    1.  No definite CT evidence of acute thoracic vascular injury. 2.  No definite CT evidence of acute abdominal or pelvic visceral/vascular injury. 3.  No pulmonary consolidation, pleural effusions or pneumothorax. 4.  No abdominal/pelvic fluid collections or pneumoperitoneum. 5.  There is a 2.9 cm enhancing lesion noted along the posterior wall the bladder which could be neoplastic in etiology.  I recommend further urologic evaluation. 6.  Probable bilateral hydroceles. 7.  No definite acute thoracic or lumbar vertebral body compression/fracture.  There is some multilevel degenerative disc disease of the thoracic and lumbar spine.. Signed by Ayan De La O MD    CT head W O  contrast trauma protocol    Result Date: 2024  Interpreted By:  Td Guillaume and Sullivan Shannon STUDY: CT HEAD W/O CONTRAST TRAUMA PROTOCOL; CT FACIAL BONES WO IV CONTRAST; CT CERVICAL SPINE WO IV CONTRAST;  2024 11:41 am   INDICATION: Signs/Symptoms:trauma.   Chemo Maldonado MRN [id number]  1934   COMPARISON: Correlation with CT temporal bone 2012.   ACCESSION NUMBER(S): [id number]; [id number]; [id number]   ORDERING CLINICIAN: ARGENIS VU   TECHNIQUE: Noncontrast axial CT scan of head was performed. Angled reformats in brain and bone windows were generated. The images were reviewed in bone, brain, blood and soft tissue windows.   Thin cut axial CT images through the facial bones were obtained and reconstructed in the coronal and sagittal plane. 3D reconstructions were performed on an independent workstation and provided for review.   Axial CT images of the cervical spine are obtained. Axial, coronal and sagittal reconstructions are provided for review.   FINDINGS:   Noncontrast CT head:   No acute infarct or intracranial hemorrhage. No intraventricular hemorrhage. Patchy and confluent subcortical and periventricular white matter hypodensities are noted, which are nonspecific but likely reflect sequelae of chronic small ischemic changes. No mass effect or midline shift. No extra-axial fluid collection. The ventricles, sulci and basal cisterns are diffusely prominent, likely reflecting age-related parenchymal volume loss..   No displaced calvarial fracture.     CT facial bones:   Orbits: The bony orbits are intact. Bilateral lens replacements are noted. The orbital contents are otherwise unremarkable.   Facial Bones: There is no displaced facial bone fracture.   Mandible/Temporomandibular Joints: The patient is completely edentulous. Visualized portions of mandible and bilateral temporomandibular joints are intact.   Paranasal Sinuses/Mastoids: Minimal scattered mucosal thickening  in the paranasal sinuses. The bilateral mastoid air cells are clear.   Soft tissues: Unremarkable.     CT cervical spine:   There is transitional vertebra in the cervicothoracic junction with truncated bilateral C7 cervical ribs. For the purposes of this examination, the transitional vertebra will be referred to as C7 with the assumption of 7 cervical vertebrae.   Alignment: The atlantooccipital and atlantoaxial intervals are preserved. The cervical spinal columns are preserved, including the spinolaminar and posterior spinous lines, without evidence of spondylolisthesis.   Vertebrae/Intervertebral Discs: The vertebral bodies and intervertebral discs demonstrate expected height. Mild multilevel degenerative changes of the cervical spine are noted, including osteophytosis and posterior disc osteophyte complexes.   No fracture of the cervical spine.   Sesamoid ossicles are noted in the nuchal ligament overlying C3-C4. The prevertebral and posterior paraspinous soft tissues are otherwise unremarkable.           1. No acute intracranial abnormality. No calvarial fracture. 2. No acute facial bone fracture. 3. No acute fracture subluxation of the cervical spine detected     I personally reviewed the images/study and I agree with the findings as stated by Radiology resident Dr. Garcia.   MACRO: None   Signed by: Td Guillaume 2024 12:41 PM Dictation workstation:   [id number]    CT cervical spine wo IV contrast    Result Date: 2024  Interpreted By:  Td Guillaume and Sullivan Shannon STUDY: CT HEAD W/O CONTRAST TRAUMA PROTOCOL; CT FACIAL BONES WO IV CONTRAST; CT CERVICAL SPINE WO IV CONTRAST;  2024 11:41 am   INDICATION: Signs/Symptoms:trauma.   Chemo Maldonado MRN [id number]  1934   COMPARISON: Correlation with CT temporal bone 2012.   ACCESSION NUMBER(S): [id number]; [id number]; [id number]   ORDERING CLINICIAN: ARGENIS VU   TECHNIQUE: Noncontrast axial CT scan of head was performed.  Angled reformats in brain and bone windows were generated. The images were reviewed in bone, brain, blood and soft tissue windows.   Thin cut axial CT images through the facial bones were obtained and reconstructed in the coronal and sagittal plane. 3D reconstructions were performed on an independent workstation and provided for review.   Axial CT images of the cervical spine are obtained. Axial, coronal and sagittal reconstructions are provided for review.   FINDINGS:   Noncontrast CT head:   No acute infarct or intracranial hemorrhage. No intraventricular hemorrhage. Patchy and confluent subcortical and periventricular white matter hypodensities are noted, which are nonspecific but likely reflect sequelae of chronic small ischemic changes. No mass effect or midline shift. No extra-axial fluid collection. The ventricles, sulci and basal cisterns are diffusely prominent, likely reflecting age-related parenchymal volume loss..   No displaced calvarial fracture.     CT facial bones:   Orbits: The bony orbits are intact. Bilateral lens replacements are noted. The orbital contents are otherwise unremarkable.   Facial Bones: There is no displaced facial bone fracture.   Mandible/Temporomandibular Joints: The patient is completely edentulous. Visualized portions of mandible and bilateral temporomandibular joints are intact.   Paranasal Sinuses/Mastoids: Minimal scattered mucosal thickening in the paranasal sinuses. The bilateral mastoid air cells are clear.   Soft tissues: Unremarkable.     CT cervical spine:   There is transitional vertebra in the cervicothoracic junction with truncated bilateral C7 cervical ribs. For the purposes of this examination, the transitional vertebra will be referred to as C7 with the assumption of 7 cervical vertebrae.   Alignment: The atlantooccipital and atlantoaxial intervals are preserved. The cervical spinal columns are preserved, including the spinolaminar and posterior spinous lines,  without evidence of spondylolisthesis.   Vertebrae/Intervertebral Discs: The vertebral bodies and intervertebral discs demonstrate expected height. Mild multilevel degenerative changes of the cervical spine are noted, including osteophytosis and posterior disc osteophyte complexes.   No fracture of the cervical spine.   Sesamoid ossicles are noted in the nuchal ligament overlying C3-C4. The prevertebral and posterior paraspinous soft tissues are otherwise unremarkable.           1. No acute intracranial abnormality. No calvarial fracture. 2. No acute facial bone fracture. 3. No acute fracture subluxation of the cervical spine detected     I personally reviewed the images/study and I agree with the findings as stated by Radiology resident Dr. Garcia.   MACRO: None   Signed by: Td Guillaume 2024 12:41 PM Dictation workstation:   [id number]    CT maxillofacial bones wo IV contrast    Result Date: 2024  Interpreted By:  Td Guillaume and Sullivan Shannon STUDY: CT HEAD W/O CONTRAST TRAUMA PROTOCOL; CT FACIAL BONES WO IV CONTRAST; CT CERVICAL SPINE WO IV CONTRAST;  2024 11:41 am   INDICATION: Signs/Symptoms:trauma.   Chemo Maldonado MRN [id number]  1934   COMPARISON: Correlation with CT temporal bone 2012.   ACCESSION NUMBER(S): [id number]; [id number]; [id number]   ORDERING CLINICIAN: ARGENIS VU   TECHNIQUE: Noncontrast axial CT scan of head was performed. Angled reformats in brain and bone windows were generated. The images were reviewed in bone, brain, blood and soft tissue windows.   Thin cut axial CT images through the facial bones were obtained and reconstructed in the coronal and sagittal plane. 3D reconstructions were performed on an independent workstation and provided for review.   Axial CT images of the cervical spine are obtained. Axial, coronal and sagittal reconstructions are provided for review.   FINDINGS:   Noncontrast CT head:   No acute infarct or intracranial  hemorrhage. No intraventricular hemorrhage. Patchy and confluent subcortical and periventricular white matter hypodensities are noted, which are nonspecific but likely reflect sequelae of chronic small ischemic changes. No mass effect or midline shift. No extra-axial fluid collection. The ventricles, sulci and basal cisterns are diffusely prominent, likely reflecting age-related parenchymal volume loss..   No displaced calvarial fracture.     CT facial bones:   Orbits: The bony orbits are intact. Bilateral lens replacements are noted. The orbital contents are otherwise unremarkable.   Facial Bones: There is no displaced facial bone fracture.   Mandible/Temporomandibular Joints: The patient is completely edentulous. Visualized portions of mandible and bilateral temporomandibular joints are intact.   Paranasal Sinuses/Mastoids: Minimal scattered mucosal thickening in the paranasal sinuses. The bilateral mastoid air cells are clear.   Soft tissues: Unremarkable.     CT cervical spine:   There is transitional vertebra in the cervicothoracic junction with truncated bilateral C7 cervical ribs. For the purposes of this examination, the transitional vertebra will be referred to as C7 with the assumption of 7 cervical vertebrae.   Alignment: The atlantooccipital and atlantoaxial intervals are preserved. The cervical spinal columns are preserved, including the spinolaminar and posterior spinous lines, without evidence of spondylolisthesis.   Vertebrae/Intervertebral Discs: The vertebral bodies and intervertebral discs demonstrate expected height. Mild multilevel degenerative changes of the cervical spine are noted, including osteophytosis and posterior disc osteophyte complexes.   No fracture of the cervical spine.   Sesamoid ossicles are noted in the nuchal ligament overlying C3-C4. The prevertebral and posterior paraspinous soft tissues are otherwise unremarkable.           1. No acute intracranial abnormality. No calvarial  fracture. 2. No acute facial bone fracture. 3. No acute fracture subluxation of the cervical spine detected     I personally reviewed the images/study and I agree with the findings as stated by Radiology resident Dr. Garcia.   MACRO: None   Signed by: Td Guillaume 4/1/2024 12:41 PM Dictation workstation:   [id number]    XR chest 1 view    Result Date: 4/1/2024  STUDY: Chest Radiograph;  4/1/2024 11:52 AM INDICATION: Trauma. COMPARISON: None Available ACCESSION NUMBER(S): [id number] ORDERING CLINICIAN: ARGENIS VU TECHNIQUE:  Frontal chest was obtained at 11:51 hours. FINDINGS: CARDIOMEDIASTINAL SILHOUETTE: Cardiomediastinal silhouette is normal in size and configuration.  LUNGS: Lungs are clear.  Elevation of the left hemidiaphragm.  ABDOMEN: No remarkable upper abdominal findings.  BONES: No acute osseous changes.    No acute process. Signed by Alberto Hubbard MD    XR pelvis 1-2 views    Result Date: 4/1/2024  STUDY: Pelvis Radiographs; 4/1/2024 11:52 AM INDICATION: Trauma. COMPARISON: None Available. ACCESSION NUMBER(S): [id number] ORDERING CLINICIAN: ARGENIS VU TECHNIQUE:  One view(s) of the pelvis. FINDINGS:  The pelvic ring is intact.  There is no acute fracture.  Mild degenerative change of the hips.    No acute osseous abnormality. Signed by Alberto Hubbard MD        Assessment/Plan   Principal Problem:    Atrial fibrillation, unspecified type (CMS/HCC)    Chemo Maldonado is an 89 y.o. male with hx of HTN, HLD, and BPH [place] [facility] [place] going down his stairs. Patient had been laying on the floor since 10pm last night until he was brought in by EMS. Labs concerning for elevated CK, elevated lactate, elevated AST and chronic thrombocytopenia. Patients presentation is consistent with rhabdomyolysis in the setting of his mechanical fall and prolonged immobilization. Patient also with new a-fib that is rate controlled.    4/04 Updates  - continue ceftriaxone for UTI   - pending  Echo to rule out structural causes for a-fib  - Continue metoprolol 12.5mg BID     # Rhabdomyolysis   :: downtrending CK level 8313-> 5877--> 3043--> 1560  :: downtrending Lactate 3.3--> 4.3--> 1.6  :: elevated transaminases    :: myalgias, patient down for several hours  :: No evidence of AARON or hemolysis   Plan:   - Tylenol for myalgias   - Continue simvastatin       # new onset a-fib, rate controlled   :: patient reports feeling fluttering in chest at night that is paroxysmal in nature   :: Rate controlled   :: [id number] Score of 3  :: TSH wnl  Plan:   - Pending echo today to rule out valvular disease or cardiac causes of new onset a-fib   - Replete electrolytes as needed      #Fall  :: likely mechanical in nature, given no LOC, no Hx of cardiac issues   Plan:  - consult PT/OT-- recommending SNF placement     # BPH  # Bladder lesion   # UTI  :: There is a 1.9 x 2.9 cm enhancing lesion along the posterior wall of  the bladder   :: No reported hematuria  :: UA positive for 3+ blood, >20 RBC; + leukocyte esterase and >50 WBC  Plan:   - follow up Ucx-- E.Coli-- f/u susceptibility   - Continue ceftriaxone 1g for UTI  - Follows with urology outpatient for BPH, will schedule follow up for lesion   - continue home finasteride  - Will hold home doxazosin     #HTN  - Will hold home lisinopril and furosemide in the setting of treatment for rhabdo and normotension, current blood pressures with systolics in 100-120s    #Delirium (improved)  - Patient delirious on 1st day of admission, but that has resolved  - Started IV Thiamine in the setting of questionable alcohol use, will continue  - delirium precautions, frequent reorientation, OOB and into chair     #Lymphedema   :: Patient stated had this for 20 years   :: slightly more edematous than baseline   :: BLE venous duplex negative for DVT  Plan:   - Compression stockings      #Thrombocytopenia   :: Platelets 85 on admission, with baseline between   ::  Thought to be secondary to benign ethnic leukopenia, ITP, or early MDS   :: Has been seen in outpatient hematology clinic for stable chronic thrombocytopenia with plan to repeat CBC in 1 year with consideration for BMBx if continues to decrease  Plan:  - Will monitor and ensure outpatient hematology follow-up     F: n/a  E: replete as needed  N: regular diet     DVT ppx: lovenox  GI ppx: not indicated     Access: PIV     Code status: DNR/DNI   NOK: Herber (nephew) [phone]              Mayuri Nascimento MD

## 2024-04-05 ENCOUNTER — APPOINTMENT (OUTPATIENT)
Dept: CARDIOLOGY | Facility: HOSPITAL | Age: 89
DRG: 558 | End: 2024-04-05
Payer: MEDICARE

## 2024-04-05 LAB
ALBUMIN SERPL BCP-MCNC: 2.3 G/DL (ref 3.4–5)
ANION GAP SERPL CALC-SCNC: 9 MMOL/L (ref 10–20)
AORTIC VALVE PEAK VELOCITY: 1.18 M/S
AV PEAK GRADIENT: 5.6 MMHG
AVA (PEAK VEL): 2.84 CM2
BASOPHILS # BLD AUTO: 0.03 X10*3/UL (ref 0–0.1)
BASOPHILS NFR BLD AUTO: 0.5 %
BUN SERPL-MCNC: 19 MG/DL (ref 6–23)
CALCIUM SERPL-MCNC: 8 MG/DL (ref 8.6–10.6)
CHLORIDE SERPL-SCNC: 108 MMOL/L (ref 98–107)
CO2 SERPL-SCNC: 28 MMOL/L (ref 21–32)
CREAT SERPL-MCNC: 0.84 MG/DL (ref 0.5–1.3)
EGFRCR SERPLBLD CKD-EPI 2021: 83 ML/MIN/1.73M*2
EOSINOPHIL # BLD AUTO: 0.18 X10*3/UL (ref 0–0.4)
EOSINOPHIL NFR BLD AUTO: 3.1 %
ERYTHROCYTE [DISTWIDTH] IN BLOOD BY AUTOMATED COUNT: 13.4 % (ref 11.5–14.5)
GLUCOSE SERPL-MCNC: 92 MG/DL (ref 74–99)
HCT VFR BLD AUTO: 39.9 % (ref 41–52)
HGB BLD-MCNC: 12.6 G/DL (ref 13.5–17.5)
IMM GRANULOCYTES # BLD AUTO: 0.13 X10*3/UL (ref 0–0.5)
IMM GRANULOCYTES NFR BLD AUTO: 2.3 % (ref 0–0.9)
LEFT VENTRICLE INTERNAL DIMENSION DIASTOLE: 4.3 CM (ref 3.5–6)
LEFT VENTRICULAR OUTFLOW TRACT DIAMETER: 2.1 CM
LYMPHOCYTES # BLD AUTO: 0.7 X10*3/UL (ref 0.8–3)
LYMPHOCYTES NFR BLD AUTO: 12.2 %
MAGNESIUM SERPL-MCNC: 2.03 MG/DL (ref 1.6–2.4)
MCH RBC QN AUTO: 30.1 PG (ref 26–34)
MCHC RBC AUTO-ENTMCNC: 31.6 G/DL (ref 32–36)
MCV RBC AUTO: 96 FL (ref 80–100)
MITRAL VALVE E/A RATIO: 0.86
MONOCYTES # BLD AUTO: 0.89 X10*3/UL (ref 0.05–0.8)
MONOCYTES NFR BLD AUTO: 15.6 %
NEUTROPHILS # BLD AUTO: 3.79 X10*3/UL (ref 1.6–5.5)
NEUTROPHILS NFR BLD AUTO: 66.3 %
NRBC BLD-RTO: 0 /100 WBCS (ref 0–0)
PHOSPHATE SERPL-MCNC: 2.8 MG/DL (ref 2.5–4.9)
PLATELET # BLD AUTO: 92 X10*3/UL (ref 150–450)
POTASSIUM SERPL-SCNC: 3.7 MMOL/L (ref 3.5–5.3)
RBC # BLD AUTO: 4.18 X10*6/UL (ref 4.5–5.9)
RIGHT VENTRICLE FREE WALL PEAK S': 16 CM/S
RIGHT VENTRICLE PEAK SYSTOLIC PRESSURE: 29 MMHG
SODIUM SERPL-SCNC: 141 MMOL/L (ref 136–145)
TRICUSPID ANNULAR PLANE SYSTOLIC EXCURSION: 1.8 CM
WBC # BLD AUTO: 5.7 X10*3/UL (ref 4.4–11.3)

## 2024-04-05 PROCEDURE — 2500000004 HC RX 250 GENERAL PHARMACY W/ HCPCS (ALT 636 FOR OP/ED): Performed by: STUDENT IN AN ORGANIZED HEALTH CARE EDUCATION/TRAINING PROGRAM

## 2024-04-05 PROCEDURE — 1200000002 HC GENERAL ROOM WITH TELEMETRY DAILY

## 2024-04-05 PROCEDURE — 2500000001 HC RX 250 WO HCPCS SELF ADMINISTERED DRUGS (ALT 637 FOR MEDICARE OP): Performed by: STUDENT IN AN ORGANIZED HEALTH CARE EDUCATION/TRAINING PROGRAM

## 2024-04-05 PROCEDURE — 2500000002 HC RX 250 W HCPCS SELF ADMINISTERED DRUGS (ALT 637 FOR MEDICARE OP, ALT 636 FOR OP/ED): Mod: MUE | Performed by: STUDENT IN AN ORGANIZED HEALTH CARE EDUCATION/TRAINING PROGRAM

## 2024-04-05 PROCEDURE — 2500000005 HC RX 250 GENERAL PHARMACY W/O HCPCS

## 2024-04-05 PROCEDURE — 2500000001 HC RX 250 WO HCPCS SELF ADMINISTERED DRUGS (ALT 637 FOR MEDICARE OP)

## 2024-04-05 PROCEDURE — 80069 RENAL FUNCTION PANEL: CPT

## 2024-04-05 PROCEDURE — 99232 SBSQ HOSP IP/OBS MODERATE 35: CPT

## 2024-04-05 PROCEDURE — 36415 COLL VENOUS BLD VENIPUNCTURE: CPT

## 2024-04-05 PROCEDURE — 2500000002 HC RX 250 W HCPCS SELF ADMINISTERED DRUGS (ALT 637 FOR MEDICARE OP, ALT 636 FOR OP/ED)

## 2024-04-05 PROCEDURE — 85025 COMPLETE CBC W/AUTO DIFF WBC: CPT

## 2024-04-05 PROCEDURE — 93306 TTE W/DOPPLER COMPLETE: CPT

## 2024-04-05 PROCEDURE — 83735 ASSAY OF MAGNESIUM: CPT

## 2024-04-05 PROCEDURE — 93306 TTE W/DOPPLER COMPLETE: CPT | Performed by: INTERNAL MEDICINE

## 2024-04-05 RX ORDER — CEPHALEXIN 500 MG/1
500 CAPSULE ORAL EVERY 6 HOURS SCHEDULED
Qty: 13 CAPSULE | Refills: 0
Start: 2024-04-05 | End: 2024-04-09

## 2024-04-05 RX ORDER — FUROSEMIDE 40 MG/1
40 TABLET ORAL DAILY
Status: DISCONTINUED | OUTPATIENT
Start: 2024-04-05 | End: 2024-04-06 | Stop reason: HOSPADM

## 2024-04-05 RX ORDER — TAMSULOSIN HYDROCHLORIDE 0.4 MG/1
0.4 CAPSULE ORAL DAILY
Start: 2024-04-05

## 2024-04-05 RX ORDER — TAMSULOSIN HYDROCHLORIDE 0.4 MG/1
0.4 CAPSULE ORAL DAILY
Status: DISCONTINUED | OUTPATIENT
Start: 2024-04-05 | End: 2024-04-06 | Stop reason: HOSPADM

## 2024-04-05 RX ORDER — POTASSIUM CHLORIDE 20 MEQ/1
40 TABLET, EXTENDED RELEASE ORAL DAILY
Status: DISCONTINUED | OUTPATIENT
Start: 2024-04-05 | End: 2024-04-06 | Stop reason: HOSPADM

## 2024-04-05 RX ADMIN — ACETAMINOPHEN 975 MG: 325 TABLET ORAL at 21:11

## 2024-04-05 RX ADMIN — FINASTERIDE 5 MG: 5 TABLET, FILM COATED ORAL at 08:44

## 2024-04-05 RX ADMIN — SIMVASTATIN 20 MG: 20 TABLET, FILM COATED ORAL at 21:11

## 2024-04-05 RX ADMIN — METOPROLOL TARTRATE 12.5 MG: 25 TABLET, FILM COATED ORAL at 06:30

## 2024-04-05 RX ADMIN — CEPHALEXIN 500 MG: 500 CAPSULE ORAL at 23:41

## 2024-04-05 RX ADMIN — FOLIC ACID 1 MG: 1 TABLET ORAL at 08:44

## 2024-04-05 RX ADMIN — PERFLUTREN 1.5 ML OF DILUTION: 6.52 INJECTION, SUSPENSION INTRAVENOUS at 12:02

## 2024-04-05 RX ADMIN — FUROSEMIDE 40 MG: 40 TABLET ORAL at 10:32

## 2024-04-05 RX ADMIN — CEPHALEXIN 500 MG: 500 CAPSULE ORAL at 06:30

## 2024-04-05 RX ADMIN — POTASSIUM CHLORIDE 40 MEQ: 1500 TABLET, EXTENDED RELEASE ORAL at 10:32

## 2024-04-05 RX ADMIN — CEPHALEXIN 500 MG: 500 CAPSULE ORAL at 13:00

## 2024-04-05 RX ADMIN — LIDOCAINE 1 PATCH: 4 PATCH TOPICAL at 08:44

## 2024-04-05 RX ADMIN — CEPHALEXIN 500 MG: 500 CAPSULE ORAL at 00:17

## 2024-04-05 RX ADMIN — CEPHALEXIN 500 MG: 500 CAPSULE ORAL at 18:22

## 2024-04-05 RX ADMIN — TAMSULOSIN HYDROCHLORIDE 0.4 MG: 0.4 CAPSULE ORAL at 10:32

## 2024-04-05 RX ADMIN — METOPROLOL TARTRATE 12.5 MG: 25 TABLET, FILM COATED ORAL at 18:22

## 2024-04-05 RX ADMIN — ENOXAPARIN SODIUM 40 MG: 100 INJECTION SUBCUTANEOUS at 21:11

## 2024-04-05 RX ADMIN — FLUTICASONE PROPIONATE 2 SPRAY: 50 SPRAY, METERED NASAL at 09:00

## 2024-04-05 ASSESSMENT — PAIN DESCRIPTION - ORIENTATION: ORIENTATION: RIGHT

## 2024-04-05 ASSESSMENT — COGNITIVE AND FUNCTIONAL STATUS - GENERAL
DAILY ACTIVITIY SCORE: 8
TURNING FROM BACK TO SIDE WHILE IN FLAT BAD: TOTAL
MOBILITY SCORE: 6
TOILETING: TOTAL
DRESSING REGULAR UPPER BODY CLOTHING: TOTAL
DRESSING REGULAR UPPER BODY CLOTHING: TOTAL
WALKING IN HOSPITAL ROOM: TOTAL
MOVING TO AND FROM BED TO CHAIR: TOTAL
MOVING FROM LYING ON BACK TO SITTING ON SIDE OF FLAT BED WITH BEDRAILS: TOTAL
MOVING FROM LYING ON BACK TO SITTING ON SIDE OF FLAT BED WITH BEDRAILS: TOTAL
MOVING TO AND FROM BED TO CHAIR: TOTAL
CLIMB 3 TO 5 STEPS WITH RAILING: TOTAL
DAILY ACTIVITIY SCORE: 8
HELP NEEDED FOR BATHING: TOTAL
TOILETING: TOTAL
CLIMB 3 TO 5 STEPS WITH RAILING: TOTAL
DRESSING REGULAR LOWER BODY CLOTHING: TOTAL
WALKING IN HOSPITAL ROOM: TOTAL
EATING MEALS: A LITTLE
PERSONAL GROOMING: TOTAL
HELP NEEDED FOR BATHING: TOTAL
MOBILITY SCORE: 6
STANDING UP FROM CHAIR USING ARMS: TOTAL
STANDING UP FROM CHAIR USING ARMS: TOTAL
TURNING FROM BACK TO SIDE WHILE IN FLAT BAD: TOTAL
PERSONAL GROOMING: TOTAL
EATING MEALS: A LITTLE
DRESSING REGULAR LOWER BODY CLOTHING: TOTAL

## 2024-04-05 ASSESSMENT — PAIN SCALES - GENERAL
PAINLEVEL_OUTOF10: 2
PAINLEVEL_OUTOF10: 6
PAINLEVEL_OUTOF10: 0 - NO PAIN

## 2024-04-05 ASSESSMENT — PAIN - FUNCTIONAL ASSESSMENT
PAIN_FUNCTIONAL_ASSESSMENT: 0-10

## 2024-04-05 ASSESSMENT — PAIN DESCRIPTION - LOCATION: LOCATION: LEG

## 2024-04-05 NOTE — PROGRESS NOTES
"Chemo Maldonado is a 89 y.o. male on day 4 of admission presenting with Atrial fibrillation, unspecified type (CMS/HCC).    Subjective   Patient seen this am, alert and oriented. Looks better than days prior. Denies pain, soreness, muscles aches, CP, SOB, or abdominal pain.     Objective     Physical Exam  Physical Exam  Constitutional:       Appearance: Normal appearance.   HENT:      Head: Normocephalic and atraumatic.   Cardiovascular:      Rate and Rhythm: Normal rate. Regular rhythm today.     Heart sounds: Normal heart sounds.   Pulmonary:      Effort: Pulmonary effort is normal.      Breath sounds: Normal breath sounds.   Abdominal:      General: Abdomen is flat. Bowel sounds are normal.      Palpations: Abdomen is soft.   Musculoskeletal:         General: Swelling present.      Cervical back: Normal range of motion. No tenderness.      Comments: 2+ BLE that patient reports has been present for 20+ years  Bruising on left shoulder    Skin:     General: Skin is warm and dry.   Neurological:      General: No focal deficit present.      Mental Status: He is alert and oriented to person, place, and time.   Psychiatric:         Mood and Affect: Mood normal.         Behavior: Behavior normal.         Thought Content: Thought content normal.     Last Recorded Vitals  Blood pressure 110/56, pulse 86, temperature 37 °C (98.6 °F), resp. rate 20, height 1.854 m (6' 1\"), weight 129 kg (285 lb), SpO2 96 %.  Intake/Output last 3 Shifts:  I/O last 3 completed shifts:  In: 740 (5.7 mL/kg) [P.O.:640; I.V.:100 (0.8 mL/kg)]  Out: 1101 (8.5 mL/kg) [Urine:1101 (0.2 mL/kg/hr)]  Weight: 129.3 kg     Relevant Results              Results for orders placed or performed during the hospital encounter of 04/01/24 (from the past 24 hour(s))   ECG 12 Lead   Result Value Ref Range    Ventricular Rate 92 BPM    Atrial Rate 92 BPM    AZ Interval 104 ms    QRS Duration 86 ms    QT Interval 354 ms    QTC Calculation(Bazett) 437 ms    P Axis 17 " degrees    R Axis 70 degrees    T Axis 64 degrees    QRS Count 15 beats    Q Onset 219 ms    P Onset 167 ms    P Offset 208 ms    T Offset 396 ms    QTC Fredericia 408 ms   CBC and Auto Differential   Result Value Ref Range    WBC 5.7 4.4 - 11.3 x10*3/uL    nRBC 0.0 0.0 - 0.0 /100 WBCs    RBC 4.18 (L) 4.50 - 5.90 x10*6/uL    Hemoglobin 12.6 (L) 13.5 - 17.5 g/dL    Hematocrit 39.9 (L) 41.0 - 52.0 %    MCV 96 80 - 100 fL    MCH 30.1 26.0 - 34.0 pg    MCHC 31.6 (L) 32.0 - 36.0 g/dL    RDW 13.4 11.5 - 14.5 %    Platelets 92 (L) 150 - 450 x10*3/uL    Neutrophils % 66.3 40.0 - 80.0 %    Immature Granulocytes %, Automated 2.3 (H) 0.0 - 0.9 %    Lymphocytes % 12.2 13.0 - 44.0 %    Monocytes % 15.6 2.0 - 10.0 %    Eosinophils % 3.1 0.0 - 6.0 %    Basophils % 0.5 0.0 - 2.0 %    Neutrophils Absolute 3.79 1.60 - 5.50 x10*3/uL    Immature Granulocytes Absolute, Automated 0.13 0.00 - 0.50 x10*3/uL    Lymphocytes Absolute 0.70 (L) 0.80 - 3.00 x10*3/uL    Monocytes Absolute 0.89 (H) 0.05 - 0.80 x10*3/uL    Eosinophils Absolute 0.18 0.00 - 0.40 x10*3/uL    Basophils Absolute 0.03 0.00 - 0.10 x10*3/uL   Renal Function Panel   Result Value Ref Range    Glucose 92 74 - 99 mg/dL    Sodium 141 136 - 145 mmol/L    Potassium 3.7 3.5 - 5.3 mmol/L    Chloride 108 (H) 98 - 107 mmol/L    Bicarbonate 28 21 - 32 mmol/L    Anion Gap 9 (L) 10 - 20 mmol/L    Urea Nitrogen 19 6 - 23 mg/dL    Creatinine 0.84 0.50 - 1.30 mg/dL    eGFR 83 >60 mL/min/1.73m*2    Calcium 8.0 (L) 8.6 - 10.6 mg/dL    Phosphorus 2.8 2.5 - 4.9 mg/dL    Albumin 2.3 (L) 3.4 - 5.0 g/dL   Magnesium   Result Value Ref Range    Magnesium 2.03 1.60 - 2.40 mg/dL     ECG 12 Lead    Result Date: 4/4/2024  Sinus rhythm with short OK with Premature atrial complexes Otherwise normal ECG No previous ECGs available Confirmed by Sraita Stewart ([id number]) on 4/4/2024 4:41:06 PM        Assessment/Plan   Principal Problem:    Atrial fibrillation, unspecified type (CMS/HCC)    Chemo Maldonado is an 89  y.o. male with hx of HTN, HLD, and BPH [place] [facility] [place] going down his stairs. Patient had been laying on the floor since 10pm last night until he was brought in by EMS. Labs concerning for elevated CK, elevated lactate, elevated AST and chronic thrombocytopenia. Patients presentation is consistent with rhabdomyolysis in the setting of his mechanical fall and prolonged immobilization. Patient also with new a-fib that is rate controlled.    4/05 Updates  - start cephalexin for  mg q6 for 4 days   - pending Echo to rule out structural causes for a-fib on admission  - Continue metoprolol 12.5mg BID     # Rhabdomyolysis   :: downtrending CK level 8313-> 5877--> 3043--> 1560  :: downtrending Lactate 3.3--> 4.3--> 1.6  :: elevated transaminases    :: myalgias, patient down for several hours  :: No evidence of AARON or hemolysis   Plan:   - Tylenol for myalgias   - Continue simvastatin       # new onset a-fib, rate controlled   :: patient reports feeling fluttering in chest at night that is paroxysmal in nature   :: Rate controlled on admission  :: recent EKG 4/4 NSR  :: [id number] Score of 3  :: TSH wnl  Plan:   - Pending echo to rule out valvular disease or other cardiac causes of new onset a-fib   - Replete electrolytes as needed      #Fall  :: likely mechanical in nature, given no LOC, no Hx of cardiac issues   Plan:  - PT/OT-- recommending SNF placement     # BPH  # Bladder lesion   # UTI  :: There is a 1.9 x 2.9 cm enhancing lesion along the posterior wall of  the bladder   :: No reported hematuria  :: UA positive for 3+ blood, >20 RBC; + leukocyte esterase and >50 WBC  Plan:   - follow up Ucx-- E.Coli-- f/u susceptibility   - Continue ceftriaxone 1g for UTI  - Follows with urology outpatient for BPH, will schedule follow up for lesion   - continue home finasteride  - Start tamsulosin 0.4mg    #HTN  :: were holding hypertensive meds in the setting of treatment for rhabdo and  normotension, current blood pressures with systolics in 100-120s  Plan:  - Will resume home lasix 40mg daily for LE edema  - Holding continue to hold home lisinopril as patient is normotensive     #Delirium (improved)  - Patient delirious on 1st day of admission, but that has resolved  - Started IV Thiamine in the setting of questionable alcohol use, will continue  - delirium precautions, frequent reorientation, OOB and into chair     #Lymphedema   :: Patient stated had this for 20 years   :: slightly more edematous than baseline   :: BLE venous duplex negative for DVT  Plan:   - Compression stockings   - Resume home lasix 40mg daily     #Thrombocytopenia   :: Platelets 85 on admission, with baseline between   :: Thought to be secondary to benign ethnic leukopenia, ITP, or early MDS   :: Has been seen in outpatient hematology clinic for stable chronic thrombocytopenia with plan to repeat CBC in 1 year with consideration for BMBx if continues to decrease  Plan:  - Will monitor and ensure outpatient hematology follow-up     F: n/a  E: replete as needed  N: regular diet     DVT ppx: lovenox  GI ppx: not indicated     Access: PIV     Code status: DNR/DNI   NOK: Herber (nephew) [phone]              Mayuri Nascimento MD

## 2024-04-05 NOTE — CARE PLAN
Problem: Safety  Goal: Patient will be injury free during hospitalization  Outcome: Progressing  Goal: I will remain free of falls  Outcome: Progressing   The patient's goals for the shift include      The clinical goals for the shift include Will maintained safety durin shift

## 2024-04-05 NOTE — PROGRESS NOTES
Chemo Maldonado is a 89 y.o. male on day 4 of admission presenting with Atrial fibrillation, unspecified type (CMS/HCC).  Transitional Care Coordination Progress Note:  Patient discussed during interdisciplinary rounds.   Team members present: MD and TCC  Plan per Medical/Surgical team: Getting Echo today  Payor: [employer]  Discharge disposition: [facility].  [facility] accepted started pre-cert with our team on 04/05/24  Potential Barriers:   ADOD: 04/07/24    RADHA GONZALEZ

## 2024-04-05 NOTE — PROGRESS NOTES
Physical Therapy                 Therapy Communication Note    Patient Name: Chemo Maldonado  MRN: [id number]  Today's Date: 4/5/2024     Discipline: Physical Therapy    Missed Visit Reason: Missed Visit Reason:  (@1054 pt off the floor at ECHO)    Missed Time: Naida Villalobos, PT

## 2024-04-05 NOTE — NURSING NOTE
END OF SHIFT NOTE     Patient remained safe and free from falls during the shift.  Patient has been pleasant and cooperative.  The patients has not complained of pain.  VSS  Patient went down for an ECHO today.  Patients Nephew/POA came to visit today.  Call light within reach.  No other events occurred throughout shift.   Nurse to nurse report was given to RN.  RN will continue to monitor.     Staci Kay LPN

## 2024-04-05 NOTE — PROGRESS NOTES
Occupational Therapy                 Therapy Communication Note    Patient Name: Chemo Maldonado  MRN: [id number]  Today's Date: 4/5/2024     Discipline: Occupational Therapy    Missed Visit Reason: Missed Visit Reason: Patient in a medical procedure (off the floor- echo- will re-att as schedule permits)    Missed Time: Attempt    Gera Keenan, OTR/L

## 2024-04-06 VITALS
HEART RATE: 90 BPM | DIASTOLIC BLOOD PRESSURE: 70 MMHG | OXYGEN SATURATION: 99 % | RESPIRATION RATE: 18 BRPM | SYSTOLIC BLOOD PRESSURE: 120 MMHG | HEIGHT: 73 IN | WEIGHT: 285 LBS | TEMPERATURE: 97.9 F | BODY MASS INDEX: 37.77 KG/M2

## 2024-04-06 LAB
ALBUMIN SERPL BCP-MCNC: 2.3 G/DL (ref 3.4–5)
ANION GAP SERPL CALC-SCNC: 11 MMOL/L (ref 10–20)
BASOPHILS # BLD AUTO: 0.02 X10*3/UL (ref 0–0.1)
BASOPHILS NFR BLD AUTO: 0.3 %
BUN SERPL-MCNC: 17 MG/DL (ref 6–23)
CALCIUM SERPL-MCNC: 8.1 MG/DL (ref 8.6–10.6)
CHLORIDE SERPL-SCNC: 107 MMOL/L (ref 98–107)
CO2 SERPL-SCNC: 27 MMOL/L (ref 21–32)
CREAT SERPL-MCNC: 0.82 MG/DL (ref 0.5–1.3)
EGFRCR SERPLBLD CKD-EPI 2021: 84 ML/MIN/1.73M*2
EOSINOPHIL # BLD AUTO: 0.22 X10*3/UL (ref 0–0.4)
EOSINOPHIL NFR BLD AUTO: 3.8 %
ERYTHROCYTE [DISTWIDTH] IN BLOOD BY AUTOMATED COUNT: 13.2 % (ref 11.5–14.5)
GLUCOSE SERPL-MCNC: 98 MG/DL (ref 74–99)
HCT VFR BLD AUTO: 37.8 % (ref 41–52)
HGB BLD-MCNC: 12.6 G/DL (ref 13.5–17.5)
IMM GRANULOCYTES # BLD AUTO: 0.09 X10*3/UL (ref 0–0.5)
IMM GRANULOCYTES NFR BLD AUTO: 1.6 % (ref 0–0.9)
LYMPHOCYTES # BLD AUTO: 0.69 X10*3/UL (ref 0.8–3)
LYMPHOCYTES NFR BLD AUTO: 12 %
MAGNESIUM SERPL-MCNC: 1.97 MG/DL (ref 1.6–2.4)
MCH RBC QN AUTO: 29.9 PG (ref 26–34)
MCHC RBC AUTO-ENTMCNC: 33.3 G/DL (ref 32–36)
MCV RBC AUTO: 90 FL (ref 80–100)
MONOCYTES # BLD AUTO: 0.75 X10*3/UL (ref 0.05–0.8)
MONOCYTES NFR BLD AUTO: 13.1 %
NEUTROPHILS # BLD AUTO: 3.97 X10*3/UL (ref 1.6–5.5)
NEUTROPHILS NFR BLD AUTO: 69.2 %
NRBC BLD-RTO: 0 /100 WBCS (ref 0–0)
PHOSPHATE SERPL-MCNC: 3.4 MG/DL (ref 2.5–4.9)
PLATELET # BLD AUTO: 111 X10*3/UL (ref 150–450)
POTASSIUM SERPL-SCNC: 3.8 MMOL/L (ref 3.5–5.3)
RBC # BLD AUTO: 4.21 X10*6/UL (ref 4.5–5.9)
SODIUM SERPL-SCNC: 141 MMOL/L (ref 136–145)
WBC # BLD AUTO: 5.7 X10*3/UL (ref 4.4–11.3)

## 2024-04-06 PROCEDURE — 2500000005 HC RX 250 GENERAL PHARMACY W/O HCPCS

## 2024-04-06 PROCEDURE — 36415 COLL VENOUS BLD VENIPUNCTURE: CPT

## 2024-04-06 PROCEDURE — 80069 RENAL FUNCTION PANEL: CPT

## 2024-04-06 PROCEDURE — 2500000001 HC RX 250 WO HCPCS SELF ADMINISTERED DRUGS (ALT 637 FOR MEDICARE OP): Performed by: STUDENT IN AN ORGANIZED HEALTH CARE EDUCATION/TRAINING PROGRAM

## 2024-04-06 PROCEDURE — 2500000002 HC RX 250 W HCPCS SELF ADMINISTERED DRUGS (ALT 637 FOR MEDICARE OP, ALT 636 FOR OP/ED): Mod: MUE | Performed by: STUDENT IN AN ORGANIZED HEALTH CARE EDUCATION/TRAINING PROGRAM

## 2024-04-06 PROCEDURE — 85025 COMPLETE CBC W/AUTO DIFF WBC: CPT

## 2024-04-06 PROCEDURE — 2500000001 HC RX 250 WO HCPCS SELF ADMINISTERED DRUGS (ALT 637 FOR MEDICARE OP)

## 2024-04-06 PROCEDURE — 99239 HOSP IP/OBS DSCHRG MGMT >30: CPT

## 2024-04-06 PROCEDURE — 83735 ASSAY OF MAGNESIUM: CPT

## 2024-04-06 RX ADMIN — FOLIC ACID 1 MG: 1 TABLET ORAL at 10:12

## 2024-04-06 RX ADMIN — CEPHALEXIN 500 MG: 500 CAPSULE ORAL at 05:58

## 2024-04-06 RX ADMIN — POTASSIUM CHLORIDE 40 MEQ: 1500 TABLET, EXTENDED RELEASE ORAL at 10:12

## 2024-04-06 RX ADMIN — FUROSEMIDE 40 MG: 40 TABLET ORAL at 10:12

## 2024-04-06 RX ADMIN — METOPROLOL TARTRATE 12.5 MG: 25 TABLET, FILM COATED ORAL at 05:58

## 2024-04-06 RX ADMIN — LIDOCAINE 1 PATCH: 4 PATCH TOPICAL at 10:12

## 2024-04-06 RX ADMIN — CEPHALEXIN 500 MG: 500 CAPSULE ORAL at 12:00

## 2024-04-06 RX ADMIN — FINASTERIDE 5 MG: 5 TABLET, FILM COATED ORAL at 10:12

## 2024-04-06 RX ADMIN — TAMSULOSIN HYDROCHLORIDE 0.4 MG: 0.4 CAPSULE ORAL at 10:12

## 2024-04-06 ASSESSMENT — COGNITIVE AND FUNCTIONAL STATUS - GENERAL
MOVING TO AND FROM BED TO CHAIR: A LOT
DAILY ACTIVITIY SCORE: 13
MOBILITY SCORE: 11
EATING MEALS: A LITTLE
MOVING FROM LYING ON BACK TO SITTING ON SIDE OF FLAT BED WITH BEDRAILS: A LITTLE
PERSONAL GROOMING: A LOT
DRESSING REGULAR UPPER BODY CLOTHING: A LOT
TOILETING: A LOT
WALKING IN HOSPITAL ROOM: TOTAL
CLIMB 3 TO 5 STEPS WITH RAILING: TOTAL
HELP NEEDED FOR BATHING: A LOT
TURNING FROM BACK TO SIDE WHILE IN FLAT BAD: A LOT
DRESSING REGULAR LOWER BODY CLOTHING: A LOT
STANDING UP FROM CHAIR USING ARMS: A LOT

## 2024-04-06 ASSESSMENT — PAIN SCALES - GENERAL: PAINLEVEL_OUTOF10: 0 - NO PAIN

## 2024-04-06 NOTE — PROGRESS NOTES
Chemo Maldonado is a 89 y.o. male on day 5 of admission presenting with Atrial fibrillation, unspecified type (CMS/HCC).    Subjective   Received update that patients precert for [facility] was approved; confirmed with primary medical team that patient is medically ready for discharge.    Transportation requested in [employer]; awaiting confirmed  time. Completed [place] was uploaded to the facility via [email or web address]; 7000 completed in [facility]. TCC notified patients nephew who is at bedside.     Bedside RN updated of above.    UPDATE 1425 Received confirmed transportation time of 4:30pm via [facility] stretcher.     Bedside RN/Unit secretary updated.    -Gabriela HARRISON MA, LSW  [phone] or [facility] Secure Chat  Care Transitions

## 2024-04-06 NOTE — DISCHARGE SUMMARY
Discharge Diagnosis  Atrial fibrillation, unspecified type (CMS/HCC)    Issues Requiring Follow-Up  Holter monitor will be mailed home  Urology follow up for bladder lesion   Cardiology follow regarding a-fib    Test Results Pending At Discharge  Pending Labs       Order Current Status    Extra Urine Gray Tube Collected (04/01/24 9944)    Urinalysis with Reflex Culture and Microscopic In process            Hospital Course  Chemo Maldonado is an 89 y.o. male with hx of HTN, HLD, and BPH [place] [facility] [place] going down his stairs. Was brought in by EMS as a trauma. Patient denied prodromal symptoms. No acute findings found on imaging of any fractures. On presentation to the ED, he was found to have elevated CK, elevated lactate, elevated AST and chronic thrombocytopenia. Patients presentation is consistent with rhabdomyolysis in the setting of his mechanical fall and prolonged immobilization. He was started on IV fluids and remained on them for 3 days with down trending CK and a lactate that cleared. UA was also positive for WBC and leukocyte esterase, which was treated with ceftriaxone pending Ucx. Ucx was positive for E.coli to which he was treated with 4 days of IV ceftriaxone and 4 days of 500mg q6 Cephalexin to complete the course.     Patient also presented with new onset atrial fibrillation which could be secondary to his overall fluid status vs structural heart issue. Repeat EKG showing patient in NSR. ECHO was obtained which showed 1. Left ventricular systolic function is normal with a 60-65% estimated ejection fraction.   2. Spectral Doppler shows an impaired relaxation pattern of left ventricular diastolic filling.   3. Mild aortic valve regurgitation.    Patient's doxazosin was held in regards to his recent falls and was started on 0.4mg Tamsulosin. Lasix 40mg daily was continued after improvement in rhabdomyolysis picture.     Patient will also have holter monitor sent to home since came  in with a-fib but that has since resolved.     Patient was found to have small bladder lesion on CT, and should have further work up outpatient with his urologist that he follows with for his BPH.     Pertinent Physical Exam At Time of Discharge  Constitutional:       Appearance: Normal appearance.   HENT:      Head: Normocephalic and atraumatic.   Cardiovascular:      Rate and Rhythm: Normal rate. Regular rhythm today.     Heart sounds: Normal heart sounds.   Pulmonary:      Effort: Pulmonary effort is normal.      Breath sounds: Normal breath sounds.   Abdominal:      General: Abdomen is flat. Bowel sounds are normal.      Palpations: Abdomen is soft.   Musculoskeletal:         General: Swelling present.      Cervical back: Normal range of motion. No tenderness.      Comments: 2+ BLE that patient reports has been present for 20+ years  Bruising on left shoulder    Skin:     General: Skin is warm and dry.   Neurological:      General: No focal deficit present.      Mental Status: He is alert and oriented to person, place, and time.   Psychiatric:         Mood and Affect: Mood normal.         Behavior: Behavior normal.         Thought Content: Thought content normal.     Home Medications     Medication List      START taking these medications     acetaminophen 325 mg tablet; Commonly known as: Tylenol; Take 3 tablets   (975 mg) by mouth every 8 hours if needed for mild pain (1 - 3) or   moderate pain (4 - 6).   cephalexin 500 mg capsule; Commonly known as: Keflex; Take 1 capsule   (500 mg) by mouth every 6 hours for 13 doses. LAST DAY 4/9/2024   folic acid 1 mg tablet; Commonly known as: Folvite; Take 1 tablet (1 mg)   by mouth once daily. Do not start before April 4, 2024.   lidocaine 4 % patch; Place 1 patch over 12 hours on the skin once daily   as needed for mild pain (1 - 3). Remove & discard patch within 12 hours or   as directed by MD.   metoprolol tartrate 25 mg tablet; Commonly known as: Lopressor; Take 0.5    tablets (12.5 mg) by mouth every 12 hours.   tamsulosin 0.4 mg 24 hr capsule; Commonly known as: Flomax; Take 1   capsule (0.4 mg) by mouth once daily.     CONTINUE taking these medications     finasteride 5 mg tablet; Commonly known as: Proscar   fluticasone 50 mcg/actuation nasal spray; Commonly known as: Flonase   furosemide 40 mg tablet; Commonly known as: Lasix   lubricating eye drops ophthalmic solution   potassium chloride CR 20 mEq ER tablet; Commonly known as: Klor-Con M20   simvastatin 20 mg tablet; Commonly known as: Zocor     STOP taking these medications     doxazosin 2 mg tablet; Commonly known as: Cardura       Outpatient Follow-Up  No future appointments.    Mayuri Nascimento MD

## 2024-04-06 NOTE — PROGRESS NOTES
"Chemo Maldonado is a 89 y.o. male on day 5 of admission presenting with Atrial fibrillation, unspecified type (CMS/HCC).    Subjective   Patient seen this am, alert and oriented. Looks better than days prior. Denies pain, soreness, muscles aches, CP, SOB, or abdominal pain.     Objective     Physical Exam  Physical Exam  Constitutional:       Appearance: Normal appearance.   HENT:      Head: Normocephalic and atraumatic.   Cardiovascular:      Rate and Rhythm: Normal rate. Regular rhythm today.     Heart sounds: Normal heart sounds.   Pulmonary:      Effort: Pulmonary effort is normal.      Breath sounds: Normal breath sounds.   Abdominal:      General: Abdomen is flat. Bowel sounds are normal.      Palpations: Abdomen is soft.   Musculoskeletal:         General: Swelling present.      Cervical back: Normal range of motion. No tenderness.      Comments: 2+ BLE that patient reports has been present for 20+ years  Bruising on left shoulder    Skin:     General: Skin is warm and dry.   Neurological:      General: No focal deficit present.      Mental Status: He is alert and oriented to person, place, and time.   Psychiatric:         Mood and Affect: Mood normal.         Behavior: Behavior normal.         Thought Content: Thought content normal.     Last Recorded Vitals  Blood pressure 108/74, pulse 80, temperature 36.4 °C (97.5 °F), resp. rate 18, height 1.854 m (6' 1\"), weight 129 kg (285 lb), SpO2 100 %.  Intake/Output last 3 Shifts:  I/O last 3 completed shifts:  In: 720 (5.6 mL/kg) [P.O.:720]  Out: 2051 (15.9 mL/kg) [Urine:2051 (0.4 mL/kg/hr)]  Weight: 129.3 kg     Relevant Results              Results for orders placed or performed during the hospital encounter of 04/01/24 (from the past 24 hour(s))   Transthoracic Echo (TTE) Complete   Result Value Ref Range    AV pk tyra 1.18 m/s    LVOT diam 2.10 cm    MV E/A ratio 0.86     Tricuspid annular plane systolic excursion 1.8 cm    RV free wall pk S' 16.00 cm/s    LVIDd " 4.30 cm    RVSP 29.0 mmHg    Aortic Valve Area by Continuity of Peak Velocity 2.84 cm2    AV pk grad 5.6 mmHg   CBC and Auto Differential   Result Value Ref Range    WBC 5.7 4.4 - 11.3 x10*3/uL    nRBC 0.0 0.0 - 0.0 /100 WBCs    RBC 4.21 (L) 4.50 - 5.90 x10*6/uL    Hemoglobin 12.6 (L) 13.5 - 17.5 g/dL    Hematocrit 37.8 (L) 41.0 - 52.0 %    MCV 90 80 - 100 fL    MCH 29.9 26.0 - 34.0 pg    MCHC 33.3 32.0 - 36.0 g/dL    RDW 13.2 11.5 - 14.5 %    Platelets 111 (L) 150 - 450 x10*3/uL    Neutrophils % 69.2 40.0 - 80.0 %    Immature Granulocytes %, Automated 1.6 (H) 0.0 - 0.9 %    Lymphocytes % 12.0 13.0 - 44.0 %    Monocytes % 13.1 2.0 - 10.0 %    Eosinophils % 3.8 0.0 - 6.0 %    Basophils % 0.3 0.0 - 2.0 %    Neutrophils Absolute 3.97 1.60 - 5.50 x10*3/uL    Immature Granulocytes Absolute, Automated 0.09 0.00 - 0.50 x10*3/uL    Lymphocytes Absolute 0.69 (L) 0.80 - 3.00 x10*3/uL    Monocytes Absolute 0.75 0.05 - 0.80 x10*3/uL    Eosinophils Absolute 0.22 0.00 - 0.40 x10*3/uL    Basophils Absolute 0.02 0.00 - 0.10 x10*3/uL   Renal Function Panel   Result Value Ref Range    Glucose 98 74 - 99 mg/dL    Sodium 141 136 - 145 mmol/L    Potassium 3.8 3.5 - 5.3 mmol/L    Chloride 107 98 - 107 mmol/L    Bicarbonate 27 21 - 32 mmol/L    Anion Gap 11 10 - 20 mmol/L    Urea Nitrogen 17 6 - 23 mg/dL    Creatinine 0.82 0.50 - 1.30 mg/dL    eGFR 84 >60 mL/min/1.73m*2    Calcium 8.1 (L) 8.6 - 10.6 mg/dL    Phosphorus 3.4 2.5 - 4.9 mg/dL    Albumin 2.3 (L) 3.4 - 5.0 g/dL   Magnesium   Result Value Ref Range    Magnesium 1.97 1.60 - 2.40 mg/dL     ECG 12 Lead    Result Date: 4/4/2024  Sinus rhythm with short OK with Premature atrial complexes Otherwise normal ECG No previous ECGs available Confirmed by Sarita Stewart ([id number]) on 4/4/2024 4:41:06 PM        Assessment/Plan   Principal Problem:    Atrial fibrillation, unspecified type (CMS/HCC)    Chemo Maldonado is an 89 y.o. male with hx of HTN, HLD, and BPH [place] [facility] [place] following a  fall going down his stairs. Patient had been laying on the floor since 10pm last night until he was brought in by EMS. Labs concerning for elevated CK, elevated lactate, elevated AST and chronic thrombocytopenia. Patients presentation is consistent with rhabdomyolysis in the setting of his mechanical fall and prolonged immobilization. Patient also with new a-fib that is rate controlled.    4/06 Updates  - Continue cephalexin for  mg q6   - Continue metoprolol 12.5mg BID  - pending placement     # Rhabdomyolysis   :: downtrending CK level 8313-> 5877--> 3043--> 1560  :: downtrending Lactate 3.3--> 4.3--> 1.6  :: elevated transaminases    :: myalgias, patient down for several hours  :: No evidence of AARON or hemolysis   Plan:   - Tylenol for myalgias   - Continue simvastatin       # new onset a-fib, rate controlled   :: patient reports feeling fluttering in chest at night that is paroxysmal in nature   :: Rate controlled on admission  :: recent EKG 4/4 NSR  :: [id number] Score of 3  :: TSH wnl  Plan:   - Pending echo to rule out valvular disease or other cardiac causes of new onset a-fib   - Replete electrolytes as needed      #Fall  :: likely mechanical in nature, given no LOC, no Hx of cardiac issues   Plan:  - PT/OT-- recommending SNF placement     # BPH  # Bladder lesion   # UTI  :: There is a 1.9 x 2.9 cm enhancing lesion along the posterior wall of  the bladder   :: No reported hematuria  :: UA positive for 3+ blood, >20 RBC; + leukocyte esterase and >50 WBC  Plan:   - follow up Ucx-- E.Coli-- f/u susceptibility   - Continue ceftriaxone 1g for UTI  - Follows with urology outpatient for BPH, will schedule follow up for lesion   - continue home finasteride  - continue tamsulosin 0.4mg    #HTN  :: were holding hypertensive meds in the setting of treatment for rhabdo and normotension, current blood pressures with systolics in 100-120s  Plan:  - Will resume home lasix 40mg daily for LE edema  -  continue to hold home lisinopril as patient is normotensive     #Delirium (improved)  - Patient delirious on 1st day of admission, but that has resolved  - Started IV Thiamine in the setting of questionable alcohol use, will continue  - delirium precautions, frequent reorientation, OOB and into chair     #Lymphedema   :: Patient stated had this for 20 years   :: slightly more edematous than baseline   :: BLE venous duplex negative for DVT  Plan:   - Compression stockings   - Resume home lasix 40mg daily     #Thrombocytopenia   :: Platelets 85 on admission, with baseline between   :: Thought to be secondary to benign ethnic leukopenia, ITP, or early MDS   :: Has been seen in outpatient hematology clinic for stable chronic thrombocytopenia with plan to repeat CBC in 1 year with consideration for BMBx if continues to decrease  Plan:  - Will monitor and ensure outpatient hematology follow-up     F: n/a  E: replete as needed  N: regular diet     DVT ppx: lovenox  GI ppx: not indicated     Access: PIV     Code status: DNR/DNI   NOK: Herber (nephew) [phone]  - spoke with 4/05 and updated at bedside               Mayuri Nascimento MD

## 2024-04-06 NOTE — NURSING NOTE
Pt discharge instructions were reviewed and pt verbalized understanding. Pts iv was intact upon removal. Pt picked up by transport via stretcher dispo the [facility] rehab.

## 2024-04-06 NOTE — PROGRESS NOTES
Chemo Maldonado is a 89 y.o. male on day 5 of admission presenting with Atrial fibrillation, unspecified type (CMS/HCC).    Transitional Care Coordination Progress Note:  Patient discussed during interdisciplinary rounds.   Team members present: MD and TCC  Plan per Medical/Surgical team: Patient Medically ready.  Payor: [employer]  Discharge disposition: the [facility] in [place]. Community care to  patient at 6:30 Pm.  Potential Barriers: None  ADOD: 04/06/24        RADHA GONZALEZ

## 2024-04-06 NOTE — CARE PLAN
The patient's goals for the shift include      The clinical goals for the shift include Will maintained safety during shift.      Problem: Safety  Goal: Patient will be injury free during hospitalization  Outcome: Progressing  Goal: I will remain free of falls  Outcome: Progressing

## 2024-04-06 NOTE — DISCHARGE INSTRUCTIONS
Mr. Maldonado     You were admitted at [facility] for a condition called Rhabdomyolysis after your fall that causes muscle breakdown. You also came in with an irregular heart rhythm known as atrial fibrillation that has since resolved with treatment. However, we will mail you a holter monitor to wear to see if you go back into this rhythm and to follow up with cardiology. We have added one medication for your heart to control the rate, it is called metoprolol. Please take this twice daily as prescribed. In addition, we stopped your doxazosin because it can contribute to falls and started you on a medication called tamsulosin for your prostate. You also had a bladder infection that we treated with antibiotics.    It is important you follow up with your cardiology as well your urologist.     Thank you for letting us participate in your care.